# Patient Record
Sex: FEMALE | Race: OTHER | HISPANIC OR LATINO
[De-identification: names, ages, dates, MRNs, and addresses within clinical notes are randomized per-mention and may not be internally consistent; named-entity substitution may affect disease eponyms.]

---

## 2018-07-11 ENCOUNTER — RESULT REVIEW (OUTPATIENT)
Age: 73
End: 2018-07-11

## 2019-02-15 PROBLEM — Z00.00 ENCOUNTER FOR PREVENTIVE HEALTH EXAMINATION: Status: ACTIVE | Noted: 2019-02-15

## 2019-02-20 ENCOUNTER — APPOINTMENT (OUTPATIENT)
Dept: SPINE | Facility: CLINIC | Age: 74
End: 2019-02-20
Payer: MEDICARE

## 2019-02-20 VITALS
DIASTOLIC BLOOD PRESSURE: 81 MMHG | TEMPERATURE: 98.3 F | HEIGHT: 60 IN | OXYGEN SATURATION: 96 % | RESPIRATION RATE: 16 BRPM | WEIGHT: 114 LBS | BODY MASS INDEX: 22.38 KG/M2 | SYSTOLIC BLOOD PRESSURE: 144 MMHG | HEART RATE: 91 BPM

## 2019-02-20 DIAGNOSIS — Z80.8 FAMILY HISTORY OF MALIGNANT NEOPLASM OF OTHER ORGANS OR SYSTEMS: ICD-10-CM

## 2019-02-20 PROCEDURE — 99204 OFFICE O/P NEW MOD 45 MIN: CPT

## 2019-02-27 ENCOUNTER — FORM ENCOUNTER (OUTPATIENT)
Age: 74
End: 2019-02-27

## 2019-02-28 ENCOUNTER — OUTPATIENT (OUTPATIENT)
Dept: OUTPATIENT SERVICES | Facility: HOSPITAL | Age: 74
LOS: 1 days | End: 2019-02-28
Payer: MEDICARE

## 2019-02-28 ENCOUNTER — APPOINTMENT (OUTPATIENT)
Dept: MRI IMAGING | Facility: HOSPITAL | Age: 74
End: 2019-02-28
Payer: MEDICARE

## 2019-02-28 VITALS
WEIGHT: 103.4 LBS | SYSTOLIC BLOOD PRESSURE: 132 MMHG | HEIGHT: 60 IN | RESPIRATION RATE: 16 BRPM | OXYGEN SATURATION: 98 % | HEART RATE: 74 BPM | TEMPERATURE: 97 F | DIASTOLIC BLOOD PRESSURE: 61 MMHG

## 2019-02-28 PROCEDURE — 70552 MRI BRAIN STEM W/DYE: CPT | Mod: 26

## 2019-02-28 NOTE — ASU PATIENT PROFILE, ADULT - PMH
GERD (gastroesophageal reflux disease)    HLD (hyperlipidemia)    Hypothyroid    Osteoporosis    Trigeminal neuralgia

## 2019-03-01 ENCOUNTER — OUTPATIENT (OUTPATIENT)
Dept: OUTPATIENT SERVICES | Facility: HOSPITAL | Age: 74
LOS: 1 days | Discharge: HOME | End: 2019-03-01
Payer: MEDICARE

## 2019-03-01 VITALS
OXYGEN SATURATION: 97 % | SYSTOLIC BLOOD PRESSURE: 112 MMHG | HEART RATE: 86 BPM | DIASTOLIC BLOOD PRESSURE: 58 MMHG | RESPIRATION RATE: 19 BRPM

## 2019-03-01 DIAGNOSIS — Z90.49 ACQUIRED ABSENCE OF OTHER SPECIFIED PARTS OF DIGESTIVE TRACT: Chronic | ICD-10-CM

## 2019-03-01 DIAGNOSIS — Z98.890 OTHER SPECIFIED POSTPROCEDURAL STATES: Chronic | ICD-10-CM

## 2019-03-01 LAB — GLUCOSE BLDC GLUCOMTR-MCNC: 115 MG/DL — HIGH (ref 70–99)

## 2019-03-01 PROCEDURE — 76000 FLUOROSCOPY <1 HR PHYS/QHP: CPT

## 2019-03-01 PROCEDURE — 70552 MRI BRAIN STEM W/DYE: CPT

## 2019-03-01 PROCEDURE — A9585: CPT

## 2019-03-01 PROCEDURE — 82962 GLUCOSE BLOOD TEST: CPT

## 2019-03-01 PROCEDURE — 64605 INJECTION TREATMENT OF NERVE: CPT | Mod: LT

## 2019-03-01 PROCEDURE — 64610 INJECTION TREATMENT OF NERVE: CPT | Mod: LT

## 2019-03-01 RX ORDER — CEPHALEXIN 500 MG
1 CAPSULE ORAL
Qty: 10 | Refills: 0
Start: 2019-03-01 | End: 2019-03-05

## 2019-03-01 RX ORDER — CARBAMAZEPINE 200 MG
200 TABLET ORAL ONCE
Qty: 0 | Refills: 0 | Status: COMPLETED | OUTPATIENT
Start: 2019-03-01 | End: 2019-03-01

## 2019-03-01 RX ORDER — SODIUM CHLORIDE 9 MG/ML
500 INJECTION, SOLUTION INTRAVENOUS
Qty: 0 | Refills: 0 | Status: DISCONTINUED | OUTPATIENT
Start: 2019-03-01 | End: 2019-03-01

## 2019-03-01 RX ADMIN — Medication 200 MILLIGRAM(S): at 19:19

## 2019-03-01 NOTE — PACU DISCHARGE NOTE - COMMENTS
discharge instruction reviewed in-full with pt using language line good understanding verbalized discharge instruction reviewed in-full with pt using language line good understanding verbalized,   pt continues to denies any distress pt d/c home.

## 2019-03-01 NOTE — PROGRESS NOTE ADULT - SUBJECTIVE AND OBJECTIVE BOX
called to evaluate this 72 yo female s/p Rhizotomy secondary trigeminal neuralgia.  pt c/o dizziness near syncope.  bp 83/53 hr 66 sao2 99%  piv started ephedrine 10 mg IVP  BP-128/63 Hr 66  bolus 500ml RL  VS now normalized  neurosurg to evaluate.

## 2019-03-13 ENCOUNTER — APPOINTMENT (OUTPATIENT)
Dept: SPINE | Facility: CLINIC | Age: 74
End: 2019-03-13
Payer: MEDICARE

## 2019-03-13 VITALS
HEART RATE: 77 BPM | BODY MASS INDEX: 22.38 KG/M2 | OXYGEN SATURATION: 95 % | HEIGHT: 60 IN | WEIGHT: 114 LBS | SYSTOLIC BLOOD PRESSURE: 117 MMHG | RESPIRATION RATE: 16 BRPM | DIASTOLIC BLOOD PRESSURE: 77 MMHG | TEMPERATURE: 98.8 F

## 2019-03-13 PROBLEM — G50.0 TRIGEMINAL NEURALGIA: Chronic | Status: ACTIVE | Noted: 2019-03-01

## 2019-03-13 PROBLEM — E78.5 HYPERLIPIDEMIA, UNSPECIFIED: Chronic | Status: ACTIVE | Noted: 2019-03-01

## 2019-03-13 PROBLEM — E03.9 HYPOTHYROIDISM, UNSPECIFIED: Chronic | Status: ACTIVE | Noted: 2019-03-01

## 2019-03-13 PROBLEM — M81.0 AGE-RELATED OSTEOPOROSIS WITHOUT CURRENT PATHOLOGICAL FRACTURE: Chronic | Status: ACTIVE | Noted: 2019-03-01

## 2019-03-13 PROBLEM — K21.9 GASTRO-ESOPHAGEAL REFLUX DISEASE WITHOUT ESOPHAGITIS: Chronic | Status: ACTIVE | Noted: 2019-03-01

## 2019-03-13 PROCEDURE — 99024 POSTOP FOLLOW-UP VISIT: CPT

## 2019-04-24 ENCOUNTER — APPOINTMENT (OUTPATIENT)
Dept: SPINE | Facility: CLINIC | Age: 74
End: 2019-04-24
Payer: MEDICARE

## 2019-04-24 VITALS
DIASTOLIC BLOOD PRESSURE: 77 MMHG | TEMPERATURE: 97.9 F | HEIGHT: 60 IN | BODY MASS INDEX: 22.38 KG/M2 | WEIGHT: 114 LBS | HEART RATE: 83 BPM | OXYGEN SATURATION: 95 % | RESPIRATION RATE: 16 BRPM | SYSTOLIC BLOOD PRESSURE: 139 MMHG

## 2019-04-24 PROCEDURE — 99214 OFFICE O/P EST MOD 30 MIN: CPT

## 2020-12-02 ENCOUNTER — APPOINTMENT (OUTPATIENT)
Dept: SPINE | Facility: CLINIC | Age: 75
End: 2020-12-02
Payer: MEDICARE

## 2020-12-02 VITALS
BODY MASS INDEX: 21.79 KG/M2 | RESPIRATION RATE: 16 BRPM | DIASTOLIC BLOOD PRESSURE: 74 MMHG | HEIGHT: 60 IN | WEIGHT: 111 LBS | SYSTOLIC BLOOD PRESSURE: 117 MMHG | OXYGEN SATURATION: 97 % | TEMPERATURE: 97.9 F | HEART RATE: 94 BPM

## 2020-12-02 PROCEDURE — 99214 OFFICE O/P EST MOD 30 MIN: CPT

## 2020-12-02 PROCEDURE — 99072 ADDL SUPL MATRL&STAF TM PHE: CPT

## 2020-12-04 ENCOUNTER — NON-APPOINTMENT (OUTPATIENT)
Age: 75
End: 2020-12-04

## 2020-12-08 LAB — SARS-COV-2 N GENE NPH QL NAA+PROBE: NOT DETECTED

## 2020-12-09 ENCOUNTER — TRANSCRIPTION ENCOUNTER (OUTPATIENT)
Age: 75
End: 2020-12-09

## 2020-12-09 VITALS
WEIGHT: 109.57 LBS | RESPIRATION RATE: 16 BRPM | OXYGEN SATURATION: 93 % | TEMPERATURE: 98 F | HEIGHT: 60 IN | HEART RATE: 98 BPM | SYSTOLIC BLOOD PRESSURE: 148 MMHG | DIASTOLIC BLOOD PRESSURE: 73 MMHG

## 2020-12-09 NOTE — ASU PATIENT PROFILE, ADULT - PMH
GERD (gastroesophageal reflux disease)    HLD (hyperlipidemia)    Hypothyroid    Osteoporosis    Trigeminal neuralgia     GERD (gastroesophageal reflux disease)    HLD (hyperlipidemia)    Hypothyroid    Osteoporosis    Sensitive skin    Trigeminal neuralgia

## 2020-12-09 NOTE — ASU PATIENT PROFILE, ADULT - NS PRO AD PATIENT TYPE
Health Care Proxy (HCP)/Myranda (aunt) [phone] Myranda Clemens (aunt) [phone]/Health Care Proxy (HCP)

## 2020-12-09 NOTE — ASU PREOP CHECKLIST - IV STARTED
I discussed importance of advanced care plans with Laura Donahue. he does not have an updated advanced care plan documented in this chart (and if not in chart, he will provide updated advanced medical directive or develop and provide AMD to be scanned). AMD literature provided to patient if needed.
no

## 2020-12-10 ENCOUNTER — OUTPATIENT (OUTPATIENT)
Dept: OUTPATIENT SERVICES | Facility: HOSPITAL | Age: 75
LOS: 1 days | Discharge: HOME | End: 2020-12-10
Payer: MEDICARE

## 2020-12-10 ENCOUNTER — APPOINTMENT (OUTPATIENT)
Dept: SPINE | Facility: HOSPITAL | Age: 75
End: 2020-12-10

## 2020-12-10 VITALS
HEART RATE: 78 BPM | RESPIRATION RATE: 16 BRPM | TEMPERATURE: 97 F | OXYGEN SATURATION: 98 % | SYSTOLIC BLOOD PRESSURE: 122 MMHG | DIASTOLIC BLOOD PRESSURE: 78 MMHG

## 2020-12-10 DIAGNOSIS — Z98.890 OTHER SPECIFIED POSTPROCEDURAL STATES: Chronic | ICD-10-CM

## 2020-12-10 DIAGNOSIS — Z90.49 ACQUIRED ABSENCE OF OTHER SPECIFIED PARTS OF DIGESTIVE TRACT: Chronic | ICD-10-CM

## 2020-12-10 PROCEDURE — 61790 TREAT TRIGEMINAL NERVE: CPT

## 2020-12-10 PROCEDURE — 76000 FLUOROSCOPY <1 HR PHYS/QHP: CPT

## 2020-12-10 PROCEDURE — 64610 INJECTION TREATMENT OF NERVE: CPT | Mod: LT

## 2020-12-10 RX ORDER — CEPHALEXIN 500 MG
1 CAPSULE ORAL
Qty: 21 | Refills: 0
Start: 2020-12-10 | End: 2020-12-16

## 2020-12-10 RX ORDER — ACETAMINOPHEN 500 MG
750 TABLET ORAL ONCE
Refills: 0 | Status: COMPLETED | OUTPATIENT
Start: 2020-12-10 | End: 2020-12-10

## 2020-12-10 RX ORDER — SODIUM CHLORIDE 9 MG/ML
1000 INJECTION INTRAMUSCULAR; INTRAVENOUS; SUBCUTANEOUS
Refills: 0 | Status: ACTIVE | OUTPATIENT
Start: 2020-12-10 | End: 2021-11-08

## 2020-12-10 RX ORDER — POVIDONE-IODINE 5 %
1 AEROSOL (ML) TOPICAL ONCE
Refills: 0 | Status: COMPLETED | OUTPATIENT
Start: 2020-12-10 | End: 2020-12-10

## 2020-12-10 RX ADMIN — Medication 1 APPLICATION(S): at 07:34

## 2020-12-10 RX ADMIN — Medication 300 MILLIGRAM(S): at 09:45

## 2020-12-10 NOTE — H&P ADULT - ASSESSMENT
72F with TN here for repeat ablative procedure on the left side  -RF ablation  -dc home after the procedure.

## 2020-12-10 NOTE — H&P ADULT - NSHPPHYSICALEXAM_GEN_ALL_CORE
AAOx3  EOMI, PERRL  Face =  DUFFY 5/5    Cardio: RRR  Pulm: normal breath sounds  Abdomen: soft non tender

## 2020-12-10 NOTE — H&P ADULT - NSICDXPASTMEDICALHX_GEN_ALL_CORE_FT
PAST MEDICAL HISTORY:  GERD (gastroesophageal reflux disease)     HLD (hyperlipidemia)     Hypothyroid     Osteoporosis     Sensitive skin     Trigeminal neuralgia

## 2020-12-11 ENCOUNTER — NON-APPOINTMENT (OUTPATIENT)
Age: 75
End: 2020-12-11

## 2020-12-14 PROBLEM — R20.3 HYPERESTHESIA: Chronic | Status: ACTIVE | Noted: 2020-12-09

## 2020-12-23 ENCOUNTER — APPOINTMENT (OUTPATIENT)
Dept: SPINE | Facility: CLINIC | Age: 75
End: 2020-12-23
Payer: MEDICARE

## 2020-12-23 VITALS
BODY MASS INDEX: 21.87 KG/M2 | TEMPERATURE: 97.7 F | HEART RATE: 77 BPM | WEIGHT: 112 LBS | DIASTOLIC BLOOD PRESSURE: 63 MMHG | SYSTOLIC BLOOD PRESSURE: 115 MMHG

## 2020-12-23 PROCEDURE — 99024 POSTOP FOLLOW-UP VISIT: CPT

## 2021-08-18 NOTE — BRIEF OPERATIVE NOTE - IV INFUSIONS - CRYSTALLOIDS
From: Sea [place]  To: Leslie Santillan  Sent: 8/17/2021 7:25 PM CDT  Subject: Non-Urgent Medical Question    Vadim Nelson I am having a colonoscopy done on August 23rd and I would like to know how to take my insulin on Monday morning and should I take my metformin. Thanks Denton Gillette   1L

## 2022-05-03 ENCOUNTER — APPOINTMENT (OUTPATIENT)
Dept: SPINE | Facility: CLINIC | Age: 77
End: 2022-05-03
Payer: MEDICARE

## 2022-05-03 VITALS
TEMPERATURE: 98.7 F | OXYGEN SATURATION: 97 % | SYSTOLIC BLOOD PRESSURE: 125 MMHG | HEART RATE: 85 BPM | BODY MASS INDEX: 21.99 KG/M2 | DIASTOLIC BLOOD PRESSURE: 76 MMHG | HEIGHT: 60 IN | RESPIRATION RATE: 18 BRPM | WEIGHT: 112 LBS

## 2022-05-03 PROCEDURE — 99214 OFFICE O/P EST MOD 30 MIN: CPT

## 2023-06-21 NOTE — ASU PREOP CHECKLIST - COMMENTS
RN spoke with Luana, spouse - no permission on chart. Pt was present. RN spoke with him, and he gave RN permission to speak with Luana regarding these concerns.    Onset: today  Location / description: hypertension - 164/102 at PT today  Precipitating Factors: Had a tooth pulled yesterday. Residual R-sided weakness from stroke that occurred 7 weeks ago. Not worsening.  Pain Scale (1-10), 10 highest: 0/10  What  improves / worsens symptoms: not addressed.   Symptom specific medications: lisinopril 40 MG, amlodipine, carvedilol   Recent visits (last 3-4 weeks) for same reason or recent surgery:  None.      PLAN:  See Provider within next few days    Patient/Caller agrees to follow recommendations. Pt has seen Hellen Rogers CNP, at [facility] on 8/31/2022, and wanted to see her again. No appointments available within appropriate timeframe, so we scheduled him with alternate provider MARQUES Tang, for 6/23/2023 at 0920. Verbalized understanding, in agreement with the plan, and had no further questions at this time. Aware to call back with new/worsening symptoms, or if at all needed.    Reason for Disposition  • Systolic BP  >= 160 OR Diastolic >= 100    Protocols used: BLOOD PRESSURE - HIGH-A-      
Regarding: M 58 Elevated Bp 164/102  ----- Message from Rosmery Stanford sent at 6/21/2023 10:45 AM CDT -----  Patient Name: Reji Medina  Caller: Luana Lane  Call Back # : [phone]    Is this for an Active episode for Home Health, Hospice or Palliative Care? No   Specialist or PCP Name: Hellen Rogers  Symptoms: Elevated /102  Pregnant (females aged 13-60. If Yes, how long?) : NA  Name of Clinic Site / Acct# : [facility]      Are you currently at (or near) your place of residence? Yes [place] [address]     Use following scripting for patients waiting for a callback:   \"Nurse callback times vary based on call volumes; please be aware the return phone call may come from an unidentified or out of state phone number. If your symptoms worsen or become life threatening while waiting, you should seek immediate assistance by calling 911 or going to the ER for evaluation.\"    
am med with a sip of water at 0435

## 2024-05-22 ENCOUNTER — APPOINTMENT (OUTPATIENT)
Dept: NEUROSURGERY | Facility: CLINIC | Age: 79
End: 2024-05-22
Payer: MEDICARE

## 2024-05-22 VITALS
BODY MASS INDEX: 20.81 KG/M2 | HEIGHT: 60 IN | DIASTOLIC BLOOD PRESSURE: 72 MMHG | HEART RATE: 86 BPM | SYSTOLIC BLOOD PRESSURE: 121 MMHG | TEMPERATURE: 97.8 F | RESPIRATION RATE: 18 BRPM | OXYGEN SATURATION: 98 % | WEIGHT: 106 LBS

## 2024-05-22 DIAGNOSIS — R26.81 UNSTEADINESS ON FEET: ICD-10-CM

## 2024-05-22 DIAGNOSIS — R51.9 HEADACHE, UNSPECIFIED: ICD-10-CM

## 2024-05-22 DIAGNOSIS — I10 ESSENTIAL (PRIMARY) HYPERTENSION: ICD-10-CM

## 2024-05-22 DIAGNOSIS — E78.00 PURE HYPERCHOLESTEROLEMIA, UNSPECIFIED: ICD-10-CM

## 2024-05-22 PROCEDURE — 99205 OFFICE O/P NEW HI 60 MIN: CPT

## 2024-05-22 RX ORDER — LEVOTHYROXINE SODIUM 0.03 MG/1
25 TABLET ORAL
Refills: 0 | Status: ACTIVE | COMMUNITY

## 2024-05-22 RX ORDER — LEVOTHYROXINE SODIUM 25 UG/1
25 TABLET ORAL DAILY
Refills: 0 | Status: DISCONTINUED | COMMUNITY
End: 2024-05-22

## 2024-05-22 RX ORDER — ALENDRONATE SODIUM 70 MG/1
70 TABLET ORAL
Refills: 0 | Status: DISCONTINUED | COMMUNITY
End: 2024-05-22

## 2024-05-22 RX ORDER — ATORVASTATIN CALCIUM 20 MG/1
20 TABLET, FILM COATED ORAL
Refills: 0 | Status: ACTIVE | COMMUNITY

## 2024-05-22 RX ORDER — CARBAMAZEPINE 300 MG/1
300 CAPSULE, EXTENDED RELEASE ORAL
Refills: 0 | Status: ACTIVE | COMMUNITY

## 2024-05-22 RX ORDER — AMLODIPINE BESYLATE 2.5 MG/1
2.5 TABLET ORAL
Refills: 0 | Status: ACTIVE | COMMUNITY

## 2024-05-22 RX ORDER — IBANDRONATE SODIUM 150 MG/1
150 TABLET ORAL
Refills: 0 | Status: ACTIVE | COMMUNITY

## 2024-05-22 NOTE — REVIEW OF SYSTEMS
[As Noted in HPI] : as noted in HPI [Depression] : depression [Hypersensitivity] : hypersensitivity [Dizziness] : dizziness [Difficulty Walking] : difficulty walking [Negative] : Respiratory [de-identified] : Fell on Good Friday 2024 and since has required cane to walk.  Sometimes wake up feeling like the room is spinning for up to 1 hr.  Left face feels like it is burning and "electric shock"-like any time she talks, eats, brushes her teeth, or touches her face. Pain then is 10/10

## 2024-05-22 NOTE — ASSESSMENT
[FreeTextEntry1] : TESSY ANDRE is a 78 year-old Chinese-speaking female with a PMHx significant for Trigeminal s/p Rhizotomy (2019, 2020), Osteoarthritis, HTN, Hypercholesterolemia, who presents to my office today for worsening Left trigeminal neuralgia.  PLAN: - obtain MRI Brain  - referred to Dr. Gonzales clinic to review and discuss further TN treatment   The patient was instructed that if they should immediately call 911 or go to the Emergency Department if they experience symptoms of severe thunderclap headache, syncope, unexplained nausea/vomiting, visual changes, seizure-like activity, new weakness or numbness of extremities.   Patient verbalizes agreement and understanding with plan of care.

## 2024-05-22 NOTE — REASON FOR VISIT
[New Patient Visit] : a new patient visit [Referred By: _________] : Patient was referred by ALICE [Family Member] : family member [FreeTextEntry1] : Left Trigeminal Neuralgia exacerbation

## 2024-05-22 NOTE — PHYSICAL EXAM
[General Appearance - Alert] : alert [Oriented To Time, Place, And Person] : oriented to person, place, and time [Impaired Insight] : insight and judgment were intact [Affect] : the affect was normal [Memory Recent] : recent memory was not impaired [Motor Tone] : muscle tone was normal in all four extremities [Motor Strength] : muscle strength was normal in all four extremities [Sclera] : the sclera and conjunctiva were normal [Full Visual Field] : full visual field [Outer Ear] : the ears and nose were normal in appearance [Neck Appearance] : the appearance of the neck was normal [] : no respiratory distress [Heart Rate And Rhythm] : heart rate was normal and rhythm regular [Skin Color & Pigmentation] : normal skin color and pigmentation [FreeTextEntry8] : uses cane in R hand for balance and extended distances [FreeTextEntry1] : 10/10 pain when touching Left face

## 2024-05-22 NOTE — HISTORY OF PRESENT ILLNESS
[de-identified] : TESSY ANDRE is a 78 year-old Ukrainian-speaking female with a PMHx significant for Trigeminal s/p Rhizotomy (2019, 2020), Osteoarthritis, HTN, Hypercholesterolemia, who presents to my office today for worsening Left trigeminal neuralgia.    Aug 2018 patient started experiencing significant shock-like Left facial pain after Lower molar tooth extraction. The pain persisted and multiple dental follow-ups proved negative as the pain etiology. She then sought treatment from her PCP who referred her to a Neurologist. MRI Brain (2/4/2019) with linear hyperintensity in the L frontal temporal lobe.  Patient saw NP Lorraine 2/20/2019 at which point plan made to treat Trigeminal Neuralgia with Rhizotomy.    3/1/19 - Left trigeminal Percutaneous Radiofrequency Rhizotomy with Dr. Mckay   3/13/19 - f/u appt with Dr. Mckay. Patient reports doing well the "electricity" of the face which prohibited talking and eating has subsided but she is concerned about numbness of the face. Reports discomfort of the Left face/site of intervention.  Immediate postop she was with periods of drooling but this has also improved. Patient to return to clinic in 4 weeks for symptom check   4/24/19 - f/u appt with Dr. Mckay. Reports she continue to do well "the electricity" is gone.  She has a little numbness but is concerned about episodes of drooling (will meet with ENT).   12/2/20 - f/u appt with Dr. Mckay. Reports she has been doing well until about 1 month ago she began having "electrical" sensation on the LEFT cheek. the pain has been "growing" and she is now unable to eat/ drink/ brush her teeth. Symptoms persist despite continuation of Carbamazepine at a decreased dose than preop (current dose 300mg QHS). She was recently advised to increase the dose but reports she becomes too sleepy so she only uses the medication at nights. Denies facial weakness/numbness but has intermittent numbness of her tongue. Dr Mckay discussed a 2nd percutaneous Rhizotomy  12/10/2020 - 2nd Left Percutaneous Rhizotomy with Dr. Mckay  12/23/2020 - f/u appt with Dr. Mckay. Reports doing well preop LEFT facial pain has resolved but she notices "numbness/feeling of anesthesia of the lip". Continue to increase activity as tolerated.  Educated patient that medications can be weaned over time with the assistance of ordering MD. Also that a small maintenance dose may be needed  5/3/22 - Reports she had been doing well until January of this year inability she noticed breakthrough facial pain resulting in an inability to eat (V3). This Similar to her pre- procedure symptoms. Denies facial weakness/numbness. Reports a recent visit saw her neurologist as well as her dentist and was told she need a molar tooth extracted but was cautioned against this by her neurologist. Symptoms persist despite continued Carbamezapine 300mg.   Neuro: Dr. Nitin Mccray

## 2024-06-04 ENCOUNTER — APPOINTMENT (OUTPATIENT)
Dept: MRI IMAGING | Facility: CLINIC | Age: 79
End: 2024-06-04
Payer: MEDICARE

## 2024-06-04 ENCOUNTER — OUTPATIENT (OUTPATIENT)
Dept: OUTPATIENT SERVICES | Facility: HOSPITAL | Age: 79
LOS: 1 days | End: 2024-06-04

## 2024-06-04 DIAGNOSIS — Z90.49 ACQUIRED ABSENCE OF OTHER SPECIFIED PARTS OF DIGESTIVE TRACT: Chronic | ICD-10-CM

## 2024-06-04 DIAGNOSIS — Z98.890 OTHER SPECIFIED POSTPROCEDURAL STATES: Chronic | ICD-10-CM

## 2024-06-04 PROCEDURE — 70553 MRI BRAIN STEM W/O & W/DYE: CPT | Mod: 26

## 2024-06-18 ENCOUNTER — APPOINTMENT (OUTPATIENT)
Dept: NEUROSURGERY | Facility: CLINIC | Age: 79
End: 2024-06-18
Payer: MEDICARE

## 2024-06-18 DIAGNOSIS — G50.0 TRIGEMINAL NEURALGIA: ICD-10-CM

## 2024-06-18 DIAGNOSIS — Z86.39 PERSONAL HISTORY OF OTHER ENDOCRINE, NUTRITIONAL AND METABOLIC DISEASE: ICD-10-CM

## 2024-06-18 DIAGNOSIS — Z09 ENCOUNTER FOR FOLLOW-UP EXAMINATION AFTER COMPLETED TREATMENT FOR CONDITIONS OTHER THAN MALIGNANT NEOPLASM: ICD-10-CM

## 2024-06-18 DIAGNOSIS — Z01.818 ENCOUNTER FOR OTHER PREPROCEDURAL EXAMINATION: ICD-10-CM

## 2024-06-18 PROCEDURE — 99214 OFFICE O/P EST MOD 30 MIN: CPT

## 2024-06-18 NOTE — REASON FOR VISIT
[New Patient Visit] : a new patient visit [[employer] ] : provided by [employer]   [TWNoteComboBox1] : Faroese

## 2024-06-18 NOTE — DATA REVIEWED
[de-identified] : brain w/wo on 6/4/2024 in PACS EXAM: [id number] - MR BRAIN WAW IC  - ORDERED BY: ROSEANNA SPANN   PROCEDURE DATE:  06/04/2024    INTERPRETATION:  PROCEDURE: MRI Brain with and without contrast  INDICATION: Trigeminal neuralgia and gait instability  TECHNIQUE: Multiplanar multi sequential MR images the brain were obtained before and after the administration of 5 mL IV Gadavist using IAC protocol, 5 mL were discarded.  COMPARISON: MRI brain 2/28/2019  FINDINGS:  There is no pathologic enhancement or mass in the lulu or within the prepontine cistern along the course of the trigeminal nerves. Again demonstrated is asymmetric and enhancement involving the lateral inferior margin the left cavernous sinus (for example series 10 image 34) and enhancement in the left foramen rotundum (for example series 10 image 31) which is not significantly changed from prior exam.  On heavily T2-weighted images there is no evidence of vascular contact with the left trigeminal nerve. Again demonstrated is vascular contact with the root entry zone of the right trigeminal nerve which is stable from prior exam. There is normal CSF intensity signal within the bilateral inner ear structures. There is no pathologic enhancement involving the bilateral cranial nerves VII and VIII complex.  The ventricles and sulci are normal in size and configuration.  There is no mass, mass effect, midline shift or extra-axial collection. There is no acute hemorrhage.  There are scattered foci of T2/FLAIR hyperintense signal in the periventricular subcortical white matter likely on the basis of mild chronic microvascular ischemic changes. The diffusion-weighted images demonstrate no recent infarction.  There is no susceptibility related signal loss to suggest hemosiderin deposition or calcification.  The vascular flow voids are present.  The sella and suprasellar regions are unremarkable.  The postcontrast images demonstrate no abnormal intracranial enhancement.  The visualized paranasal sinuses are free of mucosal disease. The mastoid air cells are well-aerated.  There is enhancement in the right parietal scalp (series 11 image 123 which is new from prior exam.  IMPRESSION:  Since prior MRI brain 2/28/2019, again demonstrated is asymmetric thickening enhancement along the lateral inferior aspect of the left cavernous sinus and foramen rotundum which is nonspecific and perhaps related to asymmetric venous plexus. Clinical correlation is recommended.  Nonspecific enhancement in the right parietal scalp, correlate for history of recent falls/scalp hematoma.  No neurovascular conflict in the left trigeminal nerve.  No acute infarction.  --- End of Report ---       SIMONE BRADY MD; Attending Radiologist This document has been electronically signed. Jun 7 2024  9:08AM

## 2024-06-18 NOTE — PHYSICAL EXAM
[General Appearance - Alert] : alert [General Appearance - In No Acute Distress] : in no acute distress [General Appearance - Well Nourished] : well nourished [General Appearance - Well-Appearing] : healthy appearing [Oriented To Time, Place, And Person] : oriented to person, place, and time [Impaired Insight] : insight and judgment were intact [Memory Recent] : recent memory was not impaired [Affect] : the affect was normal

## 2024-06-18 NOTE — ASSESSMENT
[FreeTextEntry1] : L trigeminal neuralgia. Discussed with the patient for image. Given failed conservative management and previous surgery history, surgergical intervention (rhizotomy for percutaneous nerve ablation) can be beneficial.  Advised to come to the office for physical exam and discuss surgery in detail in July 8, 2024.

## 2024-06-18 NOTE — HISTORY OF PRESENT ILLNESS
[Home] : at home, [unfilled] , at the time of the visit. [Medical Office: ([facility])___] : at the medical office located in  [Verbal consent obtained from patient] : the patient, [unfilled] [FreeTextEntry1] : L trigeminal neuralgia (h/o percutaneous Rhizotomy with Dr. Mckay) [de-identified] : TESSY ANDRE is a 78 year-old Chinese-speaking female with a PMHx significant for Trigeminal s/p Rhizotomy (2019, 2020), Osteoarthritis, HTN, Hypercholesterolemia, who presents to my office today for worsening Left trigeminal neuralgia.    Aug 2018 patient started experiencing significant shock-like Left facial pain after Lower molar tooth extraction. The pain persisted and multiple dental follow-ups proved negative as the pain etiology. She then sought treatment from her PCP who referred her to a Neurologist. MRI Brain (2/4/2019) with linear hyperintensity in the L frontal temporal lobe.  Patient saw NP Lorraine 2/20/2019 at which point plan made to treat Trigeminal Neuralgia with Rhizotomy.    3/1/19 - Left trigeminal Percutaneous Radiofrequency Rhizotomy with Dr. Mckay   3/13/19 - f/u appt with Dr. Mckay. Patient reports doing well the "electricity" of the face which prohibited talking and eating has subsided but she is concerned about numbness of the face. Reports discomfort of the Left face/site of intervention.  Immediate postop she was with periods of drooling but this has also improved. Patient to return to clinic in 4 weeks for symptom check   4/24/19 - f/u appt with Dr. Mckay. Reports she continue to do well "the electricity" is gone.  She has a little numbness but is concerned about episodes of drooling (will meet with ENT).   12/2/20 - f/u appt with Dr. Mckay. Reports she has been doing well until about 1 month ago she began having "electrical" sensation on the LEFT cheek. the pain has been "growing" and she is now unable to eat/ drink/ brush her teeth. Symptoms persist despite continuation of Carbamazepine at a decreased dose than preop (current dose 300mg QHS). She was recently advised to increase the dose but reports she becomes too sleepy so she only uses the medication at nights. Denies facial weakness/numbness but has intermittent numbness of her tongue. Dr Mckay discussed a 2nd percutaneous Rhizotomy  12/10/2020 - 2nd Left Percutaneous Rhizotomy with Dr. Mckay  12/23/2020 - f/u appt with Dr. Mckay. Reports doing well preop LEFT facial pain has resolved but she notices "numbness/feeling of anesthesia of the lip". Continue to increase activity as tolerated.  Educated patient that medications can be weaned over time with the assistance of ordering MD. Also that a small maintenance dose may be needed  5/3/22 - Reports she had been doing well until January of this year inability she noticed breakthrough facial pain resulting in an inability to eat (V3). This Similar to her pre- procedure symptoms. Denies facial weakness/numbness. Reports a recent visit saw her neurologist as well as her dentist and was told she need a molar tooth extracted but was cautioned against this by her neurologist. Symptoms persist despite continued Carbamezapine 300mg.   Neuro: Dr. Nitin Mccray

## 2024-07-03 PROBLEM — Z86.39 HISTORY OF THYROID NODULE: Status: RESOLVED | Noted: 2019-02-20 | Resolved: 2024-07-03

## 2024-07-08 ENCOUNTER — APPOINTMENT (OUTPATIENT)
Dept: NEUROSURGERY | Facility: CLINIC | Age: 79
End: 2024-07-08
Payer: MEDICARE

## 2024-07-08 ENCOUNTER — INPATIENT (INPATIENT)
Facility: HOSPITAL | Age: 79
LOS: 2 days | Discharge: HOME | End: 2024-07-11
Attending: NEUROLOGICAL SURGERY | Admitting: NEUROLOGICAL SURGERY
Payer: MEDICARE

## 2024-07-08 ENCOUNTER — RESULT REVIEW (OUTPATIENT)
Age: 79
End: 2024-07-08

## 2024-07-08 VITALS
HEIGHT: 59.06 IN | DIASTOLIC BLOOD PRESSURE: 67 MMHG | TEMPERATURE: 99 F | WEIGHT: 102.96 LBS | HEART RATE: 72 BPM | RESPIRATION RATE: 17 BRPM | OXYGEN SATURATION: 95 % | SYSTOLIC BLOOD PRESSURE: 149 MMHG

## 2024-07-08 VITALS
DIASTOLIC BLOOD PRESSURE: 90 MMHG | WEIGHT: 103.44 LBS | SYSTOLIC BLOOD PRESSURE: 150 MMHG | TEMPERATURE: 97.8 F | HEIGHT: 59.21 IN | HEART RATE: 77 BPM | OXYGEN SATURATION: 98 % | BODY MASS INDEX: 20.85 KG/M2 | RESPIRATION RATE: 18 BRPM

## 2024-07-08 DIAGNOSIS — Z98.890 OTHER SPECIFIED POSTPROCEDURAL STATES: Chronic | ICD-10-CM

## 2024-07-08 DIAGNOSIS — R51.9 HEADACHE, UNSPECIFIED: ICD-10-CM

## 2024-07-08 DIAGNOSIS — E03.9 HYPOTHYROIDISM, UNSPECIFIED: ICD-10-CM

## 2024-07-08 DIAGNOSIS — G50.0 TRIGEMINAL NEURALGIA: ICD-10-CM

## 2024-07-08 DIAGNOSIS — Z90.49 ACQUIRED ABSENCE OF OTHER SPECIFIED PARTS OF DIGESTIVE TRACT: Chronic | ICD-10-CM

## 2024-07-08 DIAGNOSIS — Z86.39 PERSONAL HISTORY OF OTHER ENDOCRINE, NUTRITIONAL AND METABOLIC DISEASE: ICD-10-CM

## 2024-07-08 DIAGNOSIS — M81.0 AGE-RELATED OSTEOPOROSIS WITHOUT CURRENT PATHOLOGICAL FRACTURE: ICD-10-CM

## 2024-07-08 DIAGNOSIS — E78.5 HYPERLIPIDEMIA, UNSPECIFIED: ICD-10-CM

## 2024-07-08 LAB
ANION GAP SERPL CALC-SCNC: 12 MMOL/L — SIGNIFICANT CHANGE UP (ref 5–17)
APTT BLD: 30.6 SEC — SIGNIFICANT CHANGE UP (ref 24.5–35.6)
BASOPHILS # BLD AUTO: 0.05 K/UL — SIGNIFICANT CHANGE UP (ref 0–0.2)
BASOPHILS NFR BLD AUTO: 0.9 % — SIGNIFICANT CHANGE UP (ref 0–2)
BLD GP AB SCN SERPL QL: NEGATIVE — SIGNIFICANT CHANGE UP
BUN SERPL-MCNC: 17 MG/DL — SIGNIFICANT CHANGE UP (ref 7–23)
CALCIUM SERPL-MCNC: 9.1 MG/DL — SIGNIFICANT CHANGE UP (ref 8.4–10.5)
CHLORIDE SERPL-SCNC: 100 MMOL/L — SIGNIFICANT CHANGE UP (ref 96–108)
CO2 SERPL-SCNC: 28 MMOL/L — SIGNIFICANT CHANGE UP (ref 22–31)
CREAT SERPL-MCNC: 0.55 MG/DL — SIGNIFICANT CHANGE UP (ref 0.5–1.3)
EGFR: 94 ML/MIN/1.73M2 — SIGNIFICANT CHANGE UP
EOSINOPHIL # BLD AUTO: 0.09 K/UL — SIGNIFICANT CHANGE UP (ref 0–0.5)
EOSINOPHIL NFR BLD AUTO: 1.6 % — SIGNIFICANT CHANGE UP (ref 0–6)
GLUCOSE SERPL-MCNC: 100 MG/DL — HIGH (ref 70–99)
HCT VFR BLD CALC: 44.6 % — SIGNIFICANT CHANGE UP (ref 34.5–45)
HGB BLD-MCNC: 14.4 G/DL — SIGNIFICANT CHANGE UP (ref 11.5–15.5)
IMM GRANULOCYTES NFR BLD AUTO: 0.2 % — SIGNIFICANT CHANGE UP (ref 0–0.9)
INR BLD: 0.89 — SIGNIFICANT CHANGE UP (ref 0.85–1.18)
LYMPHOCYTES # BLD AUTO: 1.51 K/UL — SIGNIFICANT CHANGE UP (ref 1–3.3)
LYMPHOCYTES # BLD AUTO: 26.6 % — SIGNIFICANT CHANGE UP (ref 13–44)
MCHC RBC-ENTMCNC: 30.5 PG — SIGNIFICANT CHANGE UP (ref 27–34)
MCHC RBC-ENTMCNC: 32.3 GM/DL — SIGNIFICANT CHANGE UP (ref 32–36)
MCV RBC AUTO: 94.5 FL — SIGNIFICANT CHANGE UP (ref 80–100)
MONOCYTES # BLD AUTO: 0.54 K/UL — SIGNIFICANT CHANGE UP (ref 0–0.9)
MONOCYTES NFR BLD AUTO: 9.5 % — SIGNIFICANT CHANGE UP (ref 2–14)
NEUTROPHILS # BLD AUTO: 3.47 K/UL — SIGNIFICANT CHANGE UP (ref 1.8–7.4)
NEUTROPHILS NFR BLD AUTO: 61.2 % — SIGNIFICANT CHANGE UP (ref 43–77)
NRBC # BLD: 0 /100 WBCS — SIGNIFICANT CHANGE UP (ref 0–0)
PLATELET # BLD AUTO: 275 K/UL — SIGNIFICANT CHANGE UP (ref 150–400)
POTASSIUM SERPL-MCNC: 4.2 MMOL/L — SIGNIFICANT CHANGE UP (ref 3.5–5.3)
POTASSIUM SERPL-SCNC: 4.2 MMOL/L — SIGNIFICANT CHANGE UP (ref 3.5–5.3)
PROTHROM AB SERPL-ACNC: 10.2 SEC — SIGNIFICANT CHANGE UP (ref 9.5–13)
RBC # BLD: 4.72 M/UL — SIGNIFICANT CHANGE UP (ref 3.8–5.2)
RBC # FLD: 13.2 % — SIGNIFICANT CHANGE UP (ref 10.3–14.5)
RH IG SCN BLD-IMP: POSITIVE — SIGNIFICANT CHANGE UP
SODIUM SERPL-SCNC: 140 MMOL/L — SIGNIFICANT CHANGE UP (ref 135–145)
WBC # BLD: 5.67 K/UL — SIGNIFICANT CHANGE UP (ref 3.8–10.5)
WBC # FLD AUTO: 5.67 K/UL — SIGNIFICANT CHANGE UP (ref 3.8–10.5)

## 2024-07-08 PROCEDURE — 93010 ELECTROCARDIOGRAM REPORT: CPT

## 2024-07-08 PROCEDURE — 71045 X-RAY EXAM CHEST 1 VIEW: CPT | Mod: 26

## 2024-07-08 PROCEDURE — 99215 OFFICE O/P EST HI 40 MIN: CPT

## 2024-07-08 PROCEDURE — 70450 CT HEAD/BRAIN W/O DYE: CPT | Mod: 26,MC

## 2024-07-08 PROCEDURE — 99285 EMERGENCY DEPT VISIT HI MDM: CPT

## 2024-07-08 PROCEDURE — 93306 TTE W/DOPPLER COMPLETE: CPT | Mod: 26

## 2024-07-08 PROCEDURE — 99222 1ST HOSP IP/OBS MODERATE 55: CPT

## 2024-07-08 PROCEDURE — 99221 1ST HOSP IP/OBS SF/LOW 40: CPT

## 2024-07-08 RX ORDER — IBANDRONATE SODIUM 150 MG/1
1 TABLET, FILM COATED ORAL
Refills: 0 | DISCHARGE

## 2024-07-08 RX ORDER — ACETAMINOPHEN 325 MG
650 TABLET ORAL EVERY 6 HOURS
Refills: 0 | Status: DISCONTINUED | OUTPATIENT
Start: 2024-07-08 | End: 2024-07-10

## 2024-07-08 RX ORDER — CARBAMAZEPINE 200 MG/1
300 TABLET ORAL DAILY
Refills: 0 | Status: DISCONTINUED | OUTPATIENT
Start: 2024-07-08 | End: 2024-07-10

## 2024-07-08 RX ORDER — ATORVASTATIN CALCIUM 20 MG/1
1 TABLET, FILM COATED ORAL
Refills: 0 | DISCHARGE

## 2024-07-08 RX ORDER — AMLODIPINE BESYLATE 2.5 MG/1
1 TABLET ORAL
Refills: 0 | DISCHARGE

## 2024-07-08 RX ORDER — CARBAMAZEPINE 200 MG/1
1 TABLET ORAL
Refills: 0 | DISCHARGE

## 2024-07-08 RX ORDER — AMLODIPINE BESYLATE 2.5 MG/1
2.5 TABLET ORAL DAILY
Refills: 0 | Status: DISCONTINUED | OUTPATIENT
Start: 2024-07-08 | End: 2024-07-10

## 2024-07-08 RX ORDER — LEVOTHYROXINE SODIUM 25 MCG
1 TABLET ORAL
Refills: 0 | DISCHARGE

## 2024-07-08 RX ORDER — ATORVASTATIN CALCIUM 20 MG/1
20 TABLET, FILM COATED ORAL AT BEDTIME
Refills: 0 | Status: DISCONTINUED | OUTPATIENT
Start: 2024-07-08 | End: 2024-07-10

## 2024-07-08 RX ORDER — ENOXAPARIN SODIUM 100 MG/ML
40 INJECTION SUBCUTANEOUS
Refills: 0 | Status: DISCONTINUED | OUTPATIENT
Start: 2024-07-08 | End: 2024-07-09

## 2024-07-08 RX ORDER — LEVOTHYROXINE SODIUM 25 MCG
25 TABLET ORAL DAILY
Refills: 0 | Status: DISCONTINUED | OUTPATIENT
Start: 2024-07-08 | End: 2024-07-10

## 2024-07-08 RX ADMIN — ATORVASTATIN CALCIUM 20 MILLIGRAM(S): 20 TABLET, FILM COATED ORAL at 22:24

## 2024-07-08 RX ADMIN — Medication 650 MILLIGRAM(S): at 22:24

## 2024-07-08 RX ADMIN — ENOXAPARIN SODIUM 40 MILLIGRAM(S): 100 INJECTION SUBCUTANEOUS at 22:23

## 2024-07-08 RX ADMIN — Medication 650 MILLIGRAM(S): at 23:24

## 2024-07-08 RX ADMIN — CARBAMAZEPINE 300 MILLIGRAM(S): 200 TABLET ORAL at 22:36

## 2024-07-08 NOTE — CONSULT NOTE ADULT - SUBJECTIVE AND OBJECTIVE BOX
Patient is a 78y old  Female who presents with a chief complaint of Trigeminal Neuralgia (08 Jul 2024 11:15)      HPI:  78 year old female, PMH trigeminal neuralgia s/p rhizotomy, ostearthritis, HTN, HLD, presented to neurosurgery outpatient office today for worsening facial pain. Pt started to experience L sided facial pain in 2018 after a tooth extraction and outpatient workup with MRI showed trigeminal neuralgia. Pt had rhizotomy in 2019 with Dr. Mckay with some improvement of symptoms which returned 8 months later. She had a repeat rhizotomy in 12/2020 with Dr. Mckay with improved symptoms again. Starting about 1 week ago, she has been experiencing worsening L sided face pain and "electrical" feelings similar to prior trigeminal neuralgia symptoms. She is unable to eat due to pain. She was sent to ED for admission for rhizotomy with Dr. Gonzales on 7/10.  (08 Jul 2024 11:15)      REVIEW OF SYSTEMS: see HPI    PAST MEDICAL & SURGICAL HISTORY:  Hypothyroid      HLD (hyperlipidemia)      GERD (gastroesophageal reflux disease)      Trigeminal neuralgia      Osteoporosis      Sensitive skin      S/P thyroidectomy  partial      S/P cholecystectomy          FAMILY HISTORY:    SOCIAL HISTORY:  Tobacco use:  EtOH use:  Recreational drug use:    MEDICATIONS:  MEDICATIONS  (STANDING):  amLODIPine   Tablet 2.5 milliGRAM(s) Oral daily  atorvastatin 20 milliGRAM(s) Oral at bedtime  carBAMazepine ER Capsule 300 milliGRAM(s) Oral daily  levothyroxine 25 MICROGram(s) Oral daily    MEDICATIONS  (PRN):  acetaminophen     Tablet .. 650 milliGRAM(s) Oral every 6 hours PRN Mild Pain (1 - 3)      ALLERGIES:  Allergies    No Known Allergies    Intolerances        VITAL SIGNS:  Vital Signs Last 24 Hrs  T(C): 36.8 (08 Jul 2024 12:31), Max: 37.1 (08 Jul 2024 09:35)  T(F): 98.2 (08 Jul 2024 12:31), Max: 98.7 (08 Jul 2024 09:35)  HR: 73 (08 Jul 2024 12:31) (72 - 73)  BP: 125/65 (08 Jul 2024 12:31) (125/65 - 149/67)  BP(mean): --  RR: 16 (08 Jul 2024 12:31) (16 - 17)  SpO2: 97% (08 Jul 2024 12:31) (95% - 97%)    Parameters below as of 08 Jul 2024 12:31  Patient On (Oxygen Delivery Method): room air          PHYSICAL EXAM:        LABS:                        14.4   5.67  )-----------( 275      ( 08 Jul 2024 10:13 )             44.6     07-08    140  |  100  |  17  ----------------------------<  100<H>  4.2   |  28  |  0.55    Ca    9.1      08 Jul 2024 10:13      PT/INR - ( 08 Jul 2024 10:03 )   PT: 10.2 sec;   INR: 0.89          PTT - ( 08 Jul 2024 10:03 )  PTT:30.6 sec  Urinalysis Basic - ( 08 Jul 2024 10:13 )    Color: x / Appearance: x / SG: x / pH: x  Gluc: 100 mg/dL / Ketone: x  / Bili: x / Urobili: x   Blood: x / Protein: x / Nitrite: x   Leuk Esterase: x / RBC: x / WBC x   Sq Epi: x / Non Sq Epi: x / Bacteria: x          CAPILLARY BLOOD GLUCOSE              RADIOLOGY & ADDITIONAL TESTS: Reviewed. Patient is a 78y old  Female who presents with a chief complaint of Trigeminal Neuralgia (08 Jul 2024 11:15)      HPI:  78 year old female, PMH trigeminal neuralgia s/p rhizotomy, ostearthritis, HTN, HLD, presented to neurosurgery outpatient office today for worsening facial pain. Pt started to experience L sided facial pain in 2018 after a tooth extraction and outpatient workup with MRI showed trigeminal neuralgia. Pt had rhizotomy in 2019 with Dr. Mckay with some improvement of symptoms which returned 8 months later. She had a repeat rhizotomy in 12/2020 with Dr. Mckay with improved symptoms again. Starting about 1 week ago, she has been experiencing worsening L sided face pain and "electrical" feelings similar to prior trigeminal neuralgia symptoms. She is unable to eat due to pain. She was sent to ED for admission for rhizotomy with Dr. Gonzales on 7/10.  (08 Jul 2024 11:15)    Patient seen and examined at bedside.  Reports that in the past week her L facial pain has significantly worsened.  Electric in nature, similar to prior episodes but now has gotten to the point that it is limiting her ability to eat.  Otherwise no recent changes in her health.  Takes synthroid for hypothyroidism, amlodipine for HTN, Lipitor for HLD.  No known cardiac issues other than HTN.  Has never had adverse effect from anesthesia with her prior surgeries.  Walks with a cane but is able to do things like grocery shop or walk up stairs without chest pain, takes it slow limited by gait.    REVIEW OF SYSTEMS: see HPI    PAST MEDICAL & SURGICAL HISTORY:  Hypothyroid      HLD (hyperlipidemia)      GERD (gastroesophageal reflux disease)      Trigeminal neuralgia      Osteoporosis      Sensitive skin      S/P thyroidectomy  partial      S/P cholecystectomy        SOCIAL HISTORY:  Tobacco use: Nonsmoker  EtOH use:  None   Recreational drug use: None    MEDICATIONS:  MEDICATIONS  (STANDING):  amLODIPine   Tablet 2.5 milliGRAM(s) Oral daily  atorvastatin 20 milliGRAM(s) Oral at bedtime  carBAMazepine ER Capsule 300 milliGRAM(s) Oral daily  levothyroxine 25 MICROGram(s) Oral daily    MEDICATIONS  (PRN):  acetaminophen     Tablet .. 650 milliGRAM(s) Oral every 6 hours PRN Mild Pain (1 - 3)      ALLERGIES:  Allergies    No Known Allergies    Intolerances        VITAL SIGNS:  Vital Signs Last 24 Hrs  T(C): 36.8 (08 Jul 2024 12:31), Max: 37.1 (08 Jul 2024 09:35)  T(F): 98.2 (08 Jul 2024 12:31), Max: 98.7 (08 Jul 2024 09:35)  HR: 73 (08 Jul 2024 12:31) (72 - 73)  BP: 125/65 (08 Jul 2024 12:31) (125/65 - 149/67)  BP(mean): --  RR: 16 (08 Jul 2024 12:31) (16 - 17)  SpO2: 97% (08 Jul 2024 12:31) (95% - 97%)    Parameters below as of 08 Jul 2024 12:31  Patient On (Oxygen Delivery Method): room air        PHYSICAL EXAM:  Constitutional: NAD, elderly female, comfortable in bed.  HEENT: PERRLA, EOMI, no conjunctival pallor or scleral icterus, MMM, some facial asymmetry due to tense L jaw  Neck: Supple  Respiratory: CTA B/L. No w/r/r.   Cardiovascular: RRR, normal S1 and S2, no m/r/g.   Gastrointestinal: +BS, soft NTND, no guarding or rebound tenderness, no palpable masses   Extremities: wwp; no cyanosis, clubbing or edema.   Vascular: Pulses equal and strong throughout.   Neurological: AAOx3, strength and sensation intact throughout.   Skin: No gross skin abnormalities or rashes          LABS:                        14.4   5.67  )-----------( 275      ( 08 Jul 2024 10:13 )             44.6     07-08    140  |  100  |  17  ----------------------------<  100<H>  4.2   |  28  |  0.55    Ca    9.1      08 Jul 2024 10:13      PT/INR - ( 08 Jul 2024 10:03 )   PT: 10.2 sec;   INR: 0.89          PTT - ( 08 Jul 2024 10:03 )  PTT:30.6 sec  Urinalysis Basic - ( 08 Jul 2024 10:13 )    Color: x / Appearance: x / SG: x / pH: x  Gluc: 100 mg/dL / Ketone: x  / Bili: x / Urobili: x   Blood: x / Protein: x / Nitrite: x   Leuk Esterase: x / RBC: x / WBC x   Sq Epi: x / Non Sq Epi: x / Bacteria: x          CAPILLARY BLOOD GLUCOSE              RADIOLOGY & ADDITIONAL TESTS: Reviewed.

## 2024-07-08 NOTE — H&P ADULT - NSHPPHYSICALEXAM_GEN_ALL_CORE
Constitutional: NAD, resting comfortably in bed.   HEENT: Pupils equal, round, reactive to light. EOMI. Face symmetric, tongue midline.  Respiratory: No respiratory distress, lungs clear to auscultation bilaterally.   Cardiovascular: RRR, S1, S2.   Gastrointestinal: Abdomen soft, non tender, nondistended.  Neurological:  AAOX3. Opens eyes. Follows commands. Speech clear.  Cranial Nerves: II-XII intact  Motor: 5/5 power in b/l UE and LE  Sensation: intact to light touch in all extremities  Pronator Drift: none  Extremities: Warm, well perfused.  Wounds: none

## 2024-07-08 NOTE — H&P ADULT - PROBLEM SELECTOR PLAN 1
- MRI maria guadalupe  - pending OR on 7/10  - pain control  - needs medical clearance for OR - CT maria guadalupe  - OR for trigeminal rhizotomy pending medical clearance   - pain control  - needs medical clearance for OR

## 2024-07-08 NOTE — H&P ADULT - HISTORY OF PRESENT ILLNESS
78 year old female, PMH trigeminal neuralgia s/p rhizotomy, ostearthritis, HTN, HLD, presented to neurosurgery outpatient office today for worsening facial pain. Pt started to experience L sided facial pain in 2018 after a tooth extraction and outpatient workup with MRI showed trigeminal neuralgia. Pt had rhizotomy in 2019 with Dr. Mckay with some improvement of symptoms which returned 8 months later. She had a repeat rhizotomy in 12/2020 with Dr. Mckay with improved symptoms again. Starting about 1 week ago, she has been experiencing worsening L sided face pain and "electrical" feelings similar to prior trigeminal neuralgia symptoms. She is unable to eat due to pain. She was sent to ED for admission for rhizotomy with Dr. Gonzales on 7/10.  78 year old female, PMH trigeminal neuralgia s/p rhizotomy, ostearthritis, HTN, HLD, presented with severe worsening facial pain. Pt started to experience L sided facial pain in 2018 after a tooth extraction and outpatient workup with MRI showed trigeminal neuralgia. Pt had rhizotomy in 2019 with Dr. Mckay with some improvement of symptoms which returned 8 months later. She had a repeat rhizotomy in 12/2020 with Dr. Mckay with improved symptoms again. Starting about 1 week ago, she has been experiencing worsening L sided face pain and "electrical" feelings similar to prior trigeminal neuralgia symptoms. She is unable to eat or drink due to pain. She was sent to ED for admission for rhizotomy and pain management.

## 2024-07-08 NOTE — H&P ADULT - ASSESSMENT
78 year old female, PMH HTN, HLD, trigeminal neuralgia s/p 2 rhizotomies, presented to outpt office with worsening L sided facial pain. Sent to ED for admission for rhizotomy with Dr. Gonzales.  78 year old female severe left trigeminal neuralgia exacerbation resulting in failure to thrive due to pain and inability to take POs effectively

## 2024-07-08 NOTE — PATIENT PROFILE ADULT - FALL HARM RISK - HARM RISK INTERVENTIONS

## 2024-07-08 NOTE — CONSULT NOTE ADULT - ASSESSMENT
78F PMH trigeminal neuralgia s/p rhizotomy, ostearthritis, HTN, HLD, hypothyroidism, osteoporosis presenting with worsening facial pain, admitted to neurosurgery for planned rhizotomy on wednesday 7/10/24. Cardiology consulted for pre-operative assessment.     Review of studies:  EKG 7/8/24: NSR rate 73    Home medications: atorvastatin 20mg qd (prev on amlodipine 2.5mg qd however per patient being held due to low pressures)    Recommendations:    #pre-operative CV risk assessment   -EKG NSR, no ischemic changes   -patient euvolemic, warm and well-perfused on exam   -no history of CAD, MI or valvular disease   -METS of at least 4 however limited by gait impairment   -would obtain TSH, lipid panel and A1C  -can obtain non-urgent TTE however does not need to hold up procedure   -patient is low-intermediate risk for low risk procedure   -no further cardiac testing needed prior to planned procedure     Recommendations not final until attested by attending  78F PMH trigeminal neuralgia s/p rhizotomy, ostearthritis, HTN, HLD, hypothyroidism, osteoporosis presenting with worsening facial pain, admitted to neurosurgery for planned rhizotomy on wednesday 7/10/24. Cardiology consulted for pre-operative assessment.     Review of studies:  EKG 7/8/24: NSR rate 73    Home medications: atorvastatin 20mg qd (prev on amlodipine 2.5mg qd however per patient being held due to low pressures)    Recommendations:    #pre-operative CV risk assessment   -EKG NSR, no ischemic changes   -patient euvolemic, warm and well-perfused on exam   -no history of CAD, MI or valvular disease   -METS of at least 4 however limited by gait impairment   -would obtain TSH, lipid panel and A1C  -can obtain non-urgent TTE however does not need to hold up procedure   -patient is low-intermediate risk for low risk procedure   -no further cardiac testing needed prior to planned procedure   -c/w home atorvastatin     Recommendations not final until attested by attending  78F PMH trigeminal neuralgia s/p rhizotomy, ostearthritis, HTN, HLD, hypothyroidism, osteoporosis presenting with worsening facial pain, admitted to neurosurgery for planned rhizotomy on wednesday 7/10/24. Cardiology consulted for pre-operative assessment.     Review of studies:  EKG 7/8/24: NSR rate 73    Home medications: atorvastatin 20mg qd (prev on amlodipine 2.5mg qd however per patient being held due to low pressures)    Recommendations:    #pre-operative CV risk assessment   -EKG NSR, no ischemic changes   -patient euvolemic, warm and well-perfused on exam   -no history of CAD, MI or valvular disease   -METS of at least 4 however limited by gait impairment   -would obtain TSH, lipid panel and A1C  -can obtain non-urgent TTE however does not need to hold up procedure   -patient is intermediate risk for low risk procedure   -no further cardiac testing needed prior to planned procedure   -c/w home atorvastatin     Recommendations not final until attested by attending

## 2024-07-08 NOTE — ED ADULT NURSE NOTE - NSFALLUNIVINTERV_ED_ALL_ED
Bed/Stretcher in lowest position, wheels locked, appropriate side rails in place/Call bell, personal items and telephone in reach/Instruct patient to call for assistance before getting out of bed/chair/stretcher/Non-slip footwear applied when patient is off stretcher/[place] to call system/Physically safe environment - no spills, clutter or unnecessary equipment/Purposeful proactive rounding/Room/bathroom lighting operational, light cord in reach

## 2024-07-08 NOTE — CONSULT NOTE ADULT - SUBJECTIVE AND OBJECTIVE BOX
HPI: 78F PMH trigeminal neuralgia s/p rhizotomy, ostearthritis, HTN, HLD, hypothyroidism, osteoporosis presented to [facility] ED with severe worsening facial pain. Pt started to experience L sided facial pain in 2018 after a tooth extraction and outpatient workup with MRI showed trigeminal neuralgia. Pt had rhizotomy in 2019 with Dr. Mckay with some improvement of symptoms which returned 8 months later. She had a repeat rhizotomy in 12/2020 with Dr. Mckay with improved symptoms again. Starting about 1 week ago, she has been experiencing worsening L sided face pain and "electrical" feelings similar to prior trigeminal neuralgia symptoms. She is unable to eat or drink due to pain. She was sent to ED for admission for rhizotomy and pain management. CT head in ED negative. Patient was admitted to neurosurgical service for rhizotomy procedure.     Patient seen and examined at bedside. Reports she is feeling ok and denies any CP or SOB. Denies any history of CAD, CHF or other cardiac history. Able to walk 10 blocks without becoming SOB or developing CP however ambulation limited by gait issues. Reports she had a nuclear stress test and echo done within the last 5 years that were normal. Does report that she has not been taking her BP medication due to low BPs and lightheadedness.       PHYSICAL EXAM:    General: lying in bed in NAD   HEENT: NC/AT; anicteric sclera; MMM  Neck: supple, no JVD   Cardiovascular: +S1/S2, RRR, no murmurs   Respiratory: CTA B/L; no W/R/R  Extremities: WWP; no LE edema   Neurological: AAOx3    VITAL SIGNS:  Vital Signs Last 24 Hrs  T(C): 36.6 (08 Jul 2024 14:30), Max: 37.1 (08 Jul 2024 09:35)  T(F): 97.9 (08 Jul 2024 14:30), Max: 98.7 (08 Jul 2024 09:35)  HR: 71 (08 Jul 2024 14:30) (71 - 73)  BP: 149/73 (08 Jul 2024 14:30) (125/65 - 149/73)  BP(mean): --  RR: 18 (08 Jul 2024 14:30) (16 - 18)  SpO2: 96% (08 Jul 2024 14:30) (95% - 97%)    Parameters below as of 08 Jul 2024 14:30  Patient On (Oxygen Delivery Method): room air          MEDICATIONS:  MEDICATIONS  (STANDING):  amLODIPine   Tablet 2.5 milliGRAM(s) Oral daily  atorvastatin 20 milliGRAM(s) Oral at bedtime  carBAMazepine ER Capsule 300 milliGRAM(s) Oral daily  levothyroxine 25 MICROGram(s) Oral daily    MEDICATIONS  (PRN):  acetaminophen     Tablet .. 650 milliGRAM(s) Oral every 6 hours PRN Mild Pain (1 - 3)      ALLERGIES:  Allergies    No Known Allergies    Intolerances        LABS:                        14.4   5.67  )-----------( 275      ( 08 Jul 2024 10:13 )             44.6     07-08    140  |  100  |  17  ----------------------------<  100<H>  4.2   |  28  |  0.55    Ca    9.1      08 Jul 2024 10:13      PT/INR - ( 08 Jul 2024 10:03 )   PT: 10.2 sec;   INR: 0.89          PTT - ( 08 Jul 2024 10:03 )  PTT:30.6 sec  Urinalysis Basic - ( 08 Jul 2024 10:13 )    Color: x / Appearance: x / SG: x / pH: x  Gluc: 100 mg/dL / Ketone: x  / Bili: x / Urobili: x   Blood: x / Protein: x / Nitrite: x   Leuk Esterase: x / RBC: x / WBC x   Sq Epi: x / Non Sq Epi: x / Bacteria: x      CAPILLARY BLOOD GLUCOSE          RADIOLOGY & ADDITIONAL TESTS: Reviewed.

## 2024-07-08 NOTE — H&P ADULT - NSHPLABSRESULTS_GEN_ALL_CORE
< from: CT Head No Cont (07.08.24 @ 11:08) >    ACC: [id number] EXAM:  CT BRAIN   ORDERED BY: YVETTEELLEN FARRELL     PROCEDURE DATE:  07/08/2024          INTERPRETATION:  CT head without IV contrast    CLINICAL INFORMATION: Preoperative    TECHNIQUE: Contiguous axial 5 mm sections were obtained throughthe head.   Sagittal and coronal 2-D reformatted images were also obtained.   This   scan was performed using automatic exposure control (radiation dose   reduction software) to obtain a diagnostic image quality scan with   patient dose as low as reasonably achievable.    FINDINGS:   No previous examinations are available for review.    The brain demonstrates no abnormal attenuation.   No acute cerebral   cortical infarct is seen.  No intracranial hemorrhage is found.  No mass   effect is found in the brain.    The ventricles, sulci and basal cisterns appear unremarkable.    The orbits are unremarkable.  The paranasal sinuses are clear.  The nasal   cavity appears intact.  The nasopharynx is symmetric.  The central skull   base, petrous temporal bones and calvarium remain intact.      IMPRESSION:   Unremarkable head CT.    --- End of Report ---            NIDHI RIVERA MD; Attending Radiologist  This document has been electronically signed. Jul 8 2024 11:29AM    < end of copied text >

## 2024-07-08 NOTE — ED ADULT TRIAGE NOTE - SPO2 (%)
[address] [facility] [address] , [facility], [address] [place] [address]  [phone]

## 2024-07-08 NOTE — H&P ADULT - NSHPREVIEWOFSYSTEMS_GEN_ALL_CORE
General:	no recent illnesses, no recent wt gain/loss, no chills  Skin/Breast: no rash, lumps, new moles, erythema, tenderness  Ophthalmologic: no change in vision, diplopia, pain, redness, tearing, dry eyes	  ENMT: no hearing loss, tinnitus, ear pain, vertigo, nasal congestion, epistaxis, sore throat  Respiratory and Thorax: no coughing, wheezing, recent URI, shortness of breath	  Cardiovascular: no chest pain, SALGADO, leg swelling, irregular rhythm   Gastrointestinal: no nausea, vomiting, diarrhea, abd pain, bloody stool, heartburn  Genitourinary: no frequency, dysuria, hematuria  Musculoskeletal: no joint pain, no joint swelling, no tenderness  Neurological:	 see HPI  Psychiatric: no confusion, no anxiousness, no depression   Hematology/Lymphatics: no brusing, easy bleeding, LAD  Endocrine: no excess urination/thirst, heat/cold intolerance  Allergic/Immunologic: no urticaria, sneezing, recurrent infections General: no recent illnesses, no recent wt gain/loss, no chills  Skin/Breast: no rash, lumps, new moles, erythema, tenderness  Ophthalmologic: no change in vision, diplopia, pain, redness, tearing, dry eyes	  ENMT: no hearing loss, tinnitus, ear pain, vertigo, nasal congestion, epistaxis, sore throat  Respiratory and Thorax: no coughing, wheezing, recent URI, shortness of breath	  Cardiovascular: no chest pain, SALGADO, leg swelling, irregular rhythm   Gastrointestinal: no nausea, vomiting, diarrhea, abd pain, bloody stool, heartburn  Genitourinary: no frequency, dysuria, hematuria  Musculoskeletal: no joint pain, no joint swelling, no tenderness  Neurological: see HPI  Psychiatric: no confusion, no anxiousness, no depression   Hematology/Lymphatics: no bruising, easy bleeding, LAD  Endocrine: no excess urination/thirst, heat/cold intolerance  Allergic/Immunologic: no urticaria, sneezing, recurrent infections

## 2024-07-08 NOTE — ED PROVIDER NOTE - CLINICAL SUMMARY MEDICAL DECISION MAKING FREE TEXT BOX
Pt hds, here for intervention for trigeminal neuralgia, NAD, no active pain, states not wanting pain meds, disc w nsgy, will obtain preop labs, admit.

## 2024-07-08 NOTE — ED ADULT NURSE NOTE - CHIEF COMPLAINT QUOTE
Pt presents to ED C/O L sided facial pain x 1 month, dx TMJ sent by Dr. Gonzales for scheduled surgery Wed. [facility] family at bedside. [phone].

## 2024-07-08 NOTE — ED ADULT TRIAGE NOTE - CHIEF COMPLAINT QUOTE
Pt presents to ED C/O L sided facial pain x 1 month, dx TMJ sent by Dr. Gonzales for scheduled surgery Wed. [facility], family at bedside. [phone].

## 2024-07-08 NOTE — CONSULT NOTE ADULT - ASSESSMENT
77 yo F with hx of HTN, HLD, hypothyroidism, trigeminal neuralgia s/p 2 rhizotomies, presented to outpt office with worsening L sided facial pain planned for rhizotomy    ***INCOMPLETE    #Trigeminal neuralgia  - plan for rhizotomy with Dr Gonzales  - pain control per primary team    #Preop assessment  - EKG pending  ***    #HTN  - c/w home amlodipine 2.5mg    #HLD  - c/w home Lipitor    #Hypothyroidism  - c/w home synthroid 25mcg    #Osteoporosis  - hold home bisphosphonate, Ibandronate    #DVT ppx - SCDs  Dispo: pending eval post op 77 yo F with hx of HTN, HLD, hypothyroidism, trigeminal neuralgia s/p 2 rhizotomies, presented to outpt office with worsening L sided facial pain planned for rhizotomy    #Trigeminal neuralgia  - plan for rhizotomy with Dr Gonzales  - pain control per primary team    #Preop assessment  - RCRI score 0, class I risk.  Holloway score 0.4-0.7% JAVON risk  - EKG NSR without ischemic changes  - METs ~4  - pending Echo    #HTN  - c/w home amlodipine 2.5mg    #HLD  - c/w home Lipitor    #Hypothyroidism  - c/w home synthroid 25mcg    #Osteoporosis  - hold home bisphosphonate, Ibandronate    #DVT ppx - SCDs  Dispo: pending eval post op 77 yo F with hx of HTN, HLD, hypothyroidism, trigeminal neuralgia s/p 2 rhizotomies, presented to outpt office with worsening L sided facial pain planned for rhizotomy    #Trigeminal neuralgia  - plan for rhizotomy with Dr Gonzales  - pain control per primary team    #Preop assessment  - RCRI score 0, class I risk.  Holloway score 0.4-0.7% JAVON risk  - EKG NSR without ischemic changes  - METs ~4  - pending Echo  - see cards note for clearance    #HTN  - c/w home amlodipine 2.5mg    #HLD  - c/w home Lipitor    #Hypothyroidism  - c/w home synthroid 25mcg    #Osteoporosis  - hold home bisphosphonate, Ibandronate    #DVT ppx - SCDs  Dispo: pending eval post op

## 2024-07-08 NOTE — ED PROVIDER NOTE - OBJECTIVE STATEMENT
77 yo pmh htn, trigeminal neuralagia w L facial pain ongoing intermittently and worsening over the past week. Pt states she's  had prior intervention for trigeminal neuralgia, no f/c, difficulty breathing, chest pain, difficulty swallowing, vision changes. took medications last night.

## 2024-07-08 NOTE — CONSULT NOTE ADULT - ATTENDING COMMENTS
Patient is a 79 yo F with PMH Trigeminal Neuralgia s/p rhizotomy, ostearthritis, HTN, HLD, hypothyroidism, osteoporosis presenting with worsening facial pain, admitted to neurosurgery for planned rhizotomy on Wednesday 7/10/24. Cardiology consulted for pre-operative CV assessment and optimization.     Review of studies:  - TTE 07/08/2024: Normal left and right ventricular size and systolic function. Normal atria. Mild aortic regurgitation. No other significant valvular disease. No evidence of pulmonary hypertension. No pericardial effusion.  - EKG 7/8/24: NSR rate 73; No ischemic changes    Home medications: atorvastatin 20mg qd (prev on amlodipine 2.5mg qd however per patient being held due to low pressures)    # Pre-operative CV risk assessment and Optimization  - Patient has no known ASCVD. Shes never had an MI or prior CVA  - She was previously diagnosed with HTN; Medications have since been discontinued due to resulting Hypotension. Patient has remained normotensive off antiHTN agents in the last year  - She reports remote CV workup with stress test done over 5 years ago for evaluation of Dyspnea. She states workup was negative  - Performance status is borderline and limited by back pain. Patient reports gait issues which limits her but she can walk 4 blocks without exertional symptoms. ROS is negative for chest pain, palpitations, dizziness  - EKG reviewed showing NSR, no ischemic changes   - Echo reviewed with normal BIV function without significant valvular heart disease  - Clinically patient is warm, well perfused and compensated. JVP if to the clavicle and there is no lower extremity edema  - At this time, there are no active CV contraindications to proceeding with rhizotomy. Patient not in ACS, decompensated HF or with any critical valvular heart disease. Patient is considered at intermediate risk for low risk procedure   -No further CV testing is needed prior to planned procedure   - Would add on TSH, A1c and Lipid profile to am labs  - In the interim please continue with Lipitor 20 mg po daily.

## 2024-07-09 ENCOUNTER — TRANSCRIPTION ENCOUNTER (OUTPATIENT)
Age: 79
End: 2024-07-09

## 2024-07-09 LAB
ANION GAP SERPL CALC-SCNC: 11 MMOL/L — SIGNIFICANT CHANGE UP (ref 5–17)
APTT BLD: 28.4 SEC — SIGNIFICANT CHANGE UP (ref 24.5–35.6)
BLD GP AB SCN SERPL QL: NEGATIVE — SIGNIFICANT CHANGE UP
BUN SERPL-MCNC: 12 MG/DL — SIGNIFICANT CHANGE UP (ref 7–23)
CALCIUM SERPL-MCNC: 8.8 MG/DL — SIGNIFICANT CHANGE UP (ref 8.4–10.5)
CHLORIDE SERPL-SCNC: 103 MMOL/L — SIGNIFICANT CHANGE UP (ref 96–108)
CO2 SERPL-SCNC: 26 MMOL/L — SIGNIFICANT CHANGE UP (ref 22–31)
CREAT SERPL-MCNC: 0.53 MG/DL — SIGNIFICANT CHANGE UP (ref 0.5–1.3)
EGFR: 95 ML/MIN/1.73M2 — SIGNIFICANT CHANGE UP
GLUCOSE SERPL-MCNC: 91 MG/DL — SIGNIFICANT CHANGE UP (ref 70–99)
HCT VFR BLD CALC: 44.2 % — SIGNIFICANT CHANGE UP (ref 34.5–45)
HGB BLD-MCNC: 14.1 G/DL — SIGNIFICANT CHANGE UP (ref 11.5–15.5)
INR BLD: 0.89 — SIGNIFICANT CHANGE UP (ref 0.85–1.18)
MAGNESIUM SERPL-MCNC: 2.8 MG/DL — HIGH (ref 1.6–2.6)
MCHC RBC-ENTMCNC: 30.1 PG — SIGNIFICANT CHANGE UP (ref 27–34)
MCHC RBC-ENTMCNC: 31.9 GM/DL — LOW (ref 32–36)
MCV RBC AUTO: 94.4 FL — SIGNIFICANT CHANGE UP (ref 80–100)
NRBC # BLD: 0 /100 WBCS — SIGNIFICANT CHANGE UP (ref 0–0)
PHOSPHATE SERPL-MCNC: 3.9 MG/DL — SIGNIFICANT CHANGE UP (ref 2.5–4.5)
PLATELET # BLD AUTO: 260 K/UL — SIGNIFICANT CHANGE UP (ref 150–400)
POTASSIUM SERPL-MCNC: 4 MMOL/L — SIGNIFICANT CHANGE UP (ref 3.5–5.3)
POTASSIUM SERPL-SCNC: 4 MMOL/L — SIGNIFICANT CHANGE UP (ref 3.5–5.3)
PROTHROM AB SERPL-ACNC: 10.2 SEC — SIGNIFICANT CHANGE UP (ref 9.5–13)
RBC # BLD: 4.68 M/UL — SIGNIFICANT CHANGE UP (ref 3.8–5.2)
RBC # FLD: 13.2 % — SIGNIFICANT CHANGE UP (ref 10.3–14.5)
RH IG SCN BLD-IMP: POSITIVE — SIGNIFICANT CHANGE UP
SODIUM SERPL-SCNC: 140 MMOL/L — SIGNIFICANT CHANGE UP (ref 135–145)
WBC # BLD: 4.56 K/UL — SIGNIFICANT CHANGE UP (ref 3.8–10.5)
WBC # FLD AUTO: 4.56 K/UL — SIGNIFICANT CHANGE UP (ref 3.8–10.5)

## 2024-07-09 PROCEDURE — 99222 1ST HOSP IP/OBS MODERATE 55: CPT | Mod: 1L

## 2024-07-09 PROCEDURE — 99233 SBSQ HOSP IP/OBS HIGH 50: CPT

## 2024-07-09 RX ORDER — POVIDONE-IODINE
1 FLAKES (GRAM) MISCELLANEOUS ONCE
Refills: 0 | Status: COMPLETED | OUTPATIENT
Start: 2024-07-10 | End: 2024-07-10

## 2024-07-09 RX ORDER — ACETAMINOPHEN 325 MG
1000 TABLET ORAL ONCE
Refills: 0 | Status: COMPLETED | OUTPATIENT
Start: 2024-07-10 | End: 2024-07-10

## 2024-07-09 RX ORDER — SODIUM CHLORIDE 0.9 % (FLUSH) 0.9 %
1000 SYRINGE (ML) INJECTION
Refills: 0 | Status: DISCONTINUED | OUTPATIENT
Start: 2024-07-09 | End: 2024-07-10

## 2024-07-09 RX ORDER — APREPITANT 125MG-80MG
40 KIT ORAL ONCE
Refills: 0 | Status: COMPLETED | OUTPATIENT
Start: 2024-07-10 | End: 2024-07-10

## 2024-07-09 RX ADMIN — AMLODIPINE BESYLATE 2.5 MILLIGRAM(S): 2.5 TABLET ORAL at 05:58

## 2024-07-09 RX ADMIN — Medication 1 APPLICATION(S): at 21:23

## 2024-07-09 RX ADMIN — ATORVASTATIN CALCIUM 20 MILLIGRAM(S): 20 TABLET, FILM COATED ORAL at 21:23

## 2024-07-09 RX ADMIN — CARBAMAZEPINE 300 MILLIGRAM(S): 200 TABLET ORAL at 21:23

## 2024-07-09 RX ADMIN — Medication 25 MICROGRAM(S): at 05:05

## 2024-07-09 NOTE — PROGRESS NOTE ADULT - ASSESSMENT
77 yo F with hx of HTN, HLD, hypothyroidism, trigeminal neuralgia s/p 2 rhizotomies, presented to outpt office with worsening L sided facial pain planned for rhizotomy    #Trigeminal neuralgia  - plan for rhizotomy with Dr Gonzales   - pain control per primary team  -Will continue carBAMazepine ER Capsule 300mg daily     #Preop assessment  - RCRI score 0, class I risk.  Holloway score 0.4-0.7% JAVON risk  - EKG NSR without ischemic changes  - METs ~4  - Pt s/p TTE on 7/8 which was WNL 9See official report above)   - Pt was seen by cardiology for pre op clearance on 7/8 and per cardiology the pt is intermediate risk for low risk procedure   -Per cardiology there is no need  further cardiac testing needed prior to planned procedure.      #HTN  -Continue  home amlodipine 2.5mg daily     #HLD  - c/w home Lipitor    #Hypothyroidism  - c/w home synthroid 25mcg    #Osteoporosis  - hold home bisphosphonate, Ibandronate    #DVT ppx   - Continue SCDs    #DISPO  - pending PT eval post op    50 minutes spent on total encounter. The necessity of the time spent during the encounter on this date of service was due to:    Review of hospital course, labs, vitals, medical records.  Bedside exam and interview of the patient with the assistance of the Priscilla hustoner  Discussed plan of care with primary team   Documenting the encounter.

## 2024-07-09 NOTE — DISCHARGE NOTE PROVIDER - HOSPITAL COURSE
HPI:  78 year old female, PMH HTN, HLD, trigeminal neuralgia s/p 2 rhizotomies, presented to outpt office with worsening L sided facial pain. Sent to ED for admission for rhizotomy with Dr. Gonzales. S/p ___________      Hospital Course:  7/8: Admitted from ED. Pending medical clearance. SQL tonight  7/9: JUAN    Patient evaluated by PT/OT who recommended:  Patient is going home? rehab? hospice? Facility Name:     Hospital course c/b:     Exam on day of discharge:    Checklist:   - Obtained follow up appointment from NP  - Reviewed final recommendations of inpatient consults  - review discharge planning on provider handoff  - post op imaging completed  - Neurologically stable for discharge  - Vitals stable for discharge   - Afebrile for discharge  - WBC is stable  - Sodium level is normal  - Pain is adequately controlled  - Pt has PICC/walker/brace/collar   - LACE score (10 or > needs PCP apt)   - stroke patient? Discharge NIHSS score   HPI:  78 year old female, PMH trigeminal neuralgia s/p rhizotomy, ostearthritis, HTN, HLD, presented to neurosurgery outpatient office today for worsening facial pain. Pt started to experience L sided facial pain in 2018 after a tooth extraction and outpatient workup with MRI showed trigeminal neuralgia. Pt had rhizotomy in 2019 with Dr. Mckay with some improvement of symptoms which returned 8 months later. She had a repeat rhizotomy in 12/2020 with Dr. Mckay with improved symptoms again. Starting about 1 week ago, she has been experiencing worsening L sided face pain and "electrical" feelings similar to prior trigeminal neuralgia symptoms. She is unable to eat due to pain. She was sent to ED for admission for rhizotomy with Dr. Gonzales on 7/10.       Hospital Course:  7/8: Admitted from ED. Pending medical clearance. SQL tonight  7/9: JUAN, pre-op OR tomorrow  7/10: JUAN, OR today  POD 0 s/p L percutaneous trigeminal rhizotomy.     Patient is going home.    Hospital course uncomplicated.    Exam on day of discharge:  Constitutional: NAD, resting comfortably in bed.   HEENT: Pupils equal, round, reactive to light. EOMI. Face symmetric, tongue midline.  Respiratory: No respiratory distress, lungs clear to auscultation bilaterally.   Cardiovascular: RRR, S1, S2.   Gastrointestinal: Abdomen soft, non tender, nondistended.  Neurological:  AAOX3. Opens eyes. Follows commands. Speech clear.  Cranial Nerves: II-XII intact  Motor: 5/5 power in b/l UE and LE  Sensation: +paresthesias to V3 portion of face on left side to light touch, o/w sensation intact to light touch in all extremities  Pronator Drift: none  Extremities: Warm, well perfused.    Patient is neurologically stable with stable vital signs, pain is adequately controlled, medically ready for discharge home.        HPI:  78 year old female, PMH trigeminal neuralgia s/p rhizotomy, ostearthritis, HTN, HLD, presented to neurosurgery outpatient office today for worsening facial pain. Pt started to experience L sided facial pain in 2018 after a tooth extraction and outpatient workup with MRI showed trigeminal neuralgia. Pt had rhizotomy in 2019 with Dr. Mckay with some improvement of symptoms which returned 8 months later. She had a repeat rhizotomy in 12/2020 with Dr. Mckay with improved symptoms again. Starting about 1 week ago, she has been experiencing worsening L sided face pain and "electrical" feelings similar to prior trigeminal neuralgia symptoms. She is unable to eat due to pain. She was sent to ED for admission for rhizotomy with Dr. Gonzales on 7/10.       Hospital Course:  7/8: Admitted from ED. Pending medical clearance. SQL tonight  7/9: JUAN, pre-op OR tomorrow  7/10: JUAN, OR today  POD 0 s/p L percutaneous trigeminal rhizotomy.   7/11: POD 1 s/p L rhizotomy. JUAN overnight, neuro stable.     Patient is going home.    Hospital course uncomplicated.    Exam on day of discharge:  Constitutional: NAD, resting comfortably in bed.   HEENT: Pupils equal, round, reactive to light. EOMI. Face symmetric, tongue midline.  Respiratory: No respiratory distress, lungs clear to auscultation bilaterally.   Cardiovascular: RRR, S1, S2.   Gastrointestinal: Abdomen soft, non tender, nondistended.  Neurological:  AAOX3. Opens eyes. Follows commands. Speech clear.  Cranial Nerves: II-XII intact  Motor: 5/5 power in b/l UE and LE  Sensation: +paresthesias to V3 portion of face on left side to light touch, o/w sensation intact to light touch in all extremities  Pronator Drift: none  Extremities: Warm, well perfused.    Patient is neurologically stable with stable vital signs, pain is adequately controlled, medically ready for discharge home.        HPI:  78 year old female, PMH trigeminal neuralgia s/p rhizotomy, ostearthritis, HTN, HLD, presented to neurosurgery outpatient office today for worsening facial pain. Pt started to experience L sided facial pain in 2018 after a tooth extraction and outpatient workup with MRI showed trigeminal neuralgia. Pt had rhizotomy in 2019 with Dr. Mckay with some improvement of symptoms which returned 8 months later. She had a repeat rhizotomy in 12/2020 with Dr. Mckay with improved symptoms again. Starting about 1 week ago, she has been experiencing worsening L sided face pain and "electrical" feelings similar to prior trigeminal neuralgia symptoms. She is unable to eat due to pain. She was sent to ED for admission for rhizotomy with Dr. Gonzales on 7/10. Now s/p L percutaneous trigeminal rhizotomy (7/10).        Hospital Course:  7/8: Admitted from ED. Pending medical clearance. SQL tonight  7/9: JUAN, pre-op OR tomorrow  7/10: JUAN, OR today  POD 0 s/p L percutaneous trigeminal rhizotomy.   7/11: POD 1 s/p L rhizotomy. JUAN overnight, neuro stable.     Patient is going home.    Hospital course uncomplicated.    Exam on day of discharge:  Constitutional: NAD, resting comfortably in bed.   HEENT: Pupils equal, round, reactive to light. EOMI. Face symmetric, tongue midline.  Respiratory: No respiratory distress, lungs clear to auscultation bilaterally.   Cardiovascular: RRR, S1, S2.   Gastrointestinal: Abdomen soft, non tender, nondistended.  Neurological:  AAOX3. Opens eyes. Follows commands. Speech clear.  Cranial Nerves: II-XII intact  Motor: 5/5 power in b/l UE and LE  Sensation: +paresthesias to V3 portion of face on left side to light touch, o/w sensation intact to light touch in all extremities  Pronator Drift: none  Extremities: Warm, well perfused.    Patient is neurologically stable with stable vital signs, pain is adequately controlled, medically ready for discharge home.

## 2024-07-09 NOTE — DISCHARGE NOTE PROVIDER - NSDCCPTREATMENT_GEN_ALL_CORE_FT
PRINCIPAL PROCEDURE  Procedure: Rhizotomy, nerve, trigeminal, with stereotactic guidance  Findings and Treatment:      PRINCIPAL PROCEDURE  Procedure: Rhizotomy, nerve, trigeminal, with stereotactic guidance  Findings and Treatment: s/p L percutaneous trigeminal rhizotomy (7/10).

## 2024-07-09 NOTE — PROGRESS NOTE ADULT - SUBJECTIVE AND OBJECTIVE BOX
HPI:  78 year old female, PMH trigeminal neuralgia s/p rhizotomy, ostearthritis, HTN, HLD, presented with severe worsening facial pain. Pt started to experience L sided facial pain in 2018 after a tooth extraction and outpatient workup with MRI showed trigeminal neuralgia. Pt had rhizotomy in 2019 with Dr. Mckay with some improvement of symptoms which returned 8 months later. She had a repeat rhizotomy in 12/2020 with Dr. Mckay with improved symptoms again. Starting about 1 week ago, she has been experiencing worsening L sided face pain and "electrical" feelings similar to prior trigeminal neuralgia symptoms. She is unable to eat or drink due to pain. She was sent to ED for admission for rhizotomy and pain management. (08 Jul 2024 11:15)    INTERVAL EVENTS: JUAN    Hospital course:   7/8: Admitted from ED. Pending medical clearance. SQL tonight  7/9: JUAN    Vital Signs Last 24 Hrs  T(C): 36.6 (08 Jul 2024 20:30), Max: 37.1 (08 Jul 2024 09:35)  T(F): 97.8 (08 Jul 2024 20:30), Max: 98.7 (08 Jul 2024 09:35)  HR: 78 (08 Jul 2024 20:30) (71 - 78)  BP: 123/67 (08 Jul 2024 20:30) (123/67 - 149/73)  BP(mean): --  RR: 16 (08 Jul 2024 20:30) (16 - 18)  SpO2: 95% (08 Jul 2024 20:30) (95% - 97%)    Parameters below as of 08 Jul 2024 20:30  Patient On (Oxygen Delivery Method): room air        I&O's Summary      PHYSICAL EXAM:  Constitutional: NAD, resting comfortably in bed.   HEENT: Pupils equal, round, reactive to light. EOMI. Face symmetric, tongue midline.  Respiratory: No respiratory distress, lungs clear to auscultation bilaterally.   Cardiovascular: RRR, S1, S2.   Gastrointestinal: Abdomen soft, non tender, nondistended.  Neurological:  AAOX3. Opens eyes. Follows commands. Speech clear.  Cranial Nerves: II-XII intact  Motor: 5/5 power in b/l UE and LE  Sensation: +paresthesias to V3 portion of face on left side to light touch, o/w sensation intact to light touch in all extremities  Pronator Drift: none  Extremities: Warm, well perfused.  Wounds: none    LABS:                        14.4   5.67  )-----------( 275      ( 08 Jul 2024 10:13 )             44.6     07-08    140  |  100  |  17  ----------------------------<  100<H>  4.2   |  28  |  0.55    Ca    9.1      08 Jul 2024 10:13      PT/INR - ( 08 Jul 2024 10:03 )   PT: 10.2 sec;   INR: 0.89          PTT - ( 08 Jul 2024 10:03 )  PTT:30.6 sec  Urinalysis Basic - ( 08 Jul 2024 10:13 )    Color: x / Appearance: x / SG: x / pH: x  Gluc: 100 mg/dL / Ketone: x  / Bili: x / Urobili: x   Blood: x / Protein: x / Nitrite: x   Leuk Esterase: x / RBC: x / WBC x   Sq Epi: x / Non Sq Epi: x / Bacteria: x          CAPILLARY BLOOD GLUCOSE          Drug Levels: [] N/A    CSF Analysis: [] N/A      Allergies    No Known Allergies    Intolerances        Home Medications:  amLODIPine 2.5 mg oral tablet: 1 tab(s) orally once a day (08 Jul 2024 11:23)  atorvastatin 20 mg oral tablet: 1 tab(s) orally once a day (08 Jul 2024 11:23)  carBAMazepine 300 mg oral capsule, extended release: 1 cap(s) orally once a day (08 Jul 2024 11:24)  ibandronate 150 mg oral tablet: 1 tab(s) orally once a day (08 Jul 2024 11:24)  levothyroxine 25 mcg (0.025 mg) oral capsule: 1 cap(s) orally once a day (08 Jul 2024 11:24)      MEDICATIONS:  MEDICATIONS  (STANDING):  amLODIPine   Tablet 2.5 milliGRAM(s) Oral daily  atorvastatin 20 milliGRAM(s) Oral at bedtime  carBAMazepine ER Capsule 300 milliGRAM(s) Oral daily  levothyroxine 25 MICROGram(s) Oral daily    MEDICATIONS  (PRN):  acetaminophen     Tablet .. 650 milliGRAM(s) Oral every 6 hours PRN Mild Pain (1 - 3)      CULTURES:      RADIOLOGY & ADDITIONAL TESTS:      ASSESSMENT:  78 year old female, PMH HTN, HLD, trigeminal neuralgia s/p 2 rhizotomies, presented to outpt office with worsening L sided facial pain. Sent to ED for admission for rhizotomy with Dr. Gonzales.     Neuro:  - neuro/vitals q8  - CT maria guadalupe complete 7/8  - pain control w/ tylenol and home carbamazepine 300mg qd  - med clearance obtained    Cardiac:  - SBP <160  - Echo (7/8): EF 70-75%  - HTN: cont amlodipine 2.5 mg qd  - HLD: continue atorvastatin 20mg qd  - cardiac clearance obtained    Pulm:  - RA    GI:  - regular diet  - pending BM    Renal:  - IVL  - voiding    Endo:  - hypothyroid: cont synthroid 25mcg    Heme:  - SCDs, SQL    ID:  - afebrile    Dispo: regional, full code, pending OR    Discussed with Dr. Gonzales    Assessment: present when checked     [] GCS   E   V   M     Heart Failure: [] Acute, [] acute on chronic, [] chronic   Heart Failure: [] Diastolic (HFpEF), [] Systolic (HRrEF), [] Combined (HFpEF and HFrEF), [] RHF, [] Pulm HTN, [] Other     [] JOSE, [] ATN, [] AIN, [] other   [] CKD1, [] CKD2, [] CKD3, [] CKD4, [] CKD5, [] ESRD     Encephalopathy: [] Metabolic, [] Hepatic, [] Toxic, [] Neurological, [] Other     Abnormal Nutritional Status: [] malnutrition (see nutrition note), []underweight: BMI <19, [] morbid obesity: BMI >40, [] Cachexia     [] Sepsis   [] Hypovolemic shock, [] Cardiogenic shock, [] Hemorrhagic shock, [] Neurogenic shock   [] Acute respiratory failure   [] Cerebral edema, [] Brain compression / herniation   [] Functional quadriplegia   [] Acute blood loss anemia

## 2024-07-09 NOTE — DISCHARGE NOTE PROVIDER - NSDCCPCAREPLAN_GEN_ALL_CORE_FT
PRINCIPAL DISCHARGE DIAGNOSIS  Diagnosis: Trigeminal neuralgia  Assessment and Plan of Treatment:       SECONDARY DISCHARGE DIAGNOSES  Diagnosis: Osteoporosis  Assessment and Plan of Treatment:     Diagnosis: HLD (hyperlipidemia)  Assessment and Plan of Treatment:     Diagnosis: Hypothyroid  Assessment and Plan of Treatment:     Diagnosis: GERD (gastroesophageal reflux disease)  Assessment and Plan of Treatment:     Diagnosis: Sensitive skin  Assessment and Plan of Treatment:     Diagnosis: S/P cholecystectomy  Assessment and Plan of Treatment:      PRINCIPAL DISCHARGE DIAGNOSIS  Diagnosis: Trigeminal neuralgia  Assessment and Plan of Treatment: s/p L percutaneous trigeminal rhizotomy (7/10).        SECONDARY DISCHARGE DIAGNOSES  Diagnosis: Osteoporosis  Assessment and Plan of Treatment:     Diagnosis: HLD (hyperlipidemia)  Assessment and Plan of Treatment:     Diagnosis: Hypothyroid  Assessment and Plan of Treatment:     Diagnosis: GERD (gastroesophageal reflux disease)  Assessment and Plan of Treatment:     Diagnosis: Sensitive skin  Assessment and Plan of Treatment:     Diagnosis: S/P cholecystectomy  Assessment and Plan of Treatment:

## 2024-07-09 NOTE — DISCHARGE NOTE PROVIDER - NSDCFUADDINST_GEN_ALL_CORE_FT
Neurosurgery follow up appointment date/time:  - are staples/sutures in place?  - what day should staples/sutures be removed (POD 10-14)?  - please call the office to confirm appointment:     Wound Care:  - can patient shower?  - does dressing need to be changed/removed?  - no picking at incision  - wears glasses?   - pressure ulcer?     Devices:  - does patient need collar or brace or helmet?   - does collar/brace need to be worn at all times or just when OOB?  - RW or cane for ambulation?    Drains/Lines:  - PICC in place? ID follow up? (Paper Rx for: antibiotics, heparin flush, weekly lab draws)  - ALVAREZ in place? Management?  - martines in place? Management/Urology follow up?  - Tracheostomy?  - PEG tube?      Activity:  - fatigue is common after surgery, rest if you feel tired   - no bending, lifting, twisting or heavy lifting   - walking is recommended, ambulate as tolerated  - you may shower when you get home, keep your incision dry  - no soaking in a tub/pool/hot tub   - no driving within 24 hours of anesthesia or while taking prescription pain medications   - keep hydrated, drink plenty of water   - skullbase precautions: no nose blowing, sneeze with mouth open, no drinking out of a straw, no straining      Inpatient consults:  - final recommendations  - you will need follow up with....    Please also follow up with your primary care doctor.     Pain Expectations:  - pain after surgery is expected  - please take pain meds as prescribed     Medications:  - Continue the following medications:   -Pain Medications:   - pain medications can cause constipation, you should eat a high fiber diet and may take a stool softener while on pain meds   - Avoid taking Advil (ibuprofen), Motrin (naproxen), or Aspirin for pain as they can cause bleeding     Call the office or come to ED if:  - wound has drainage or bleeding, increased redness or pain at incision site, neurological change, fever (>101), chills, night sweats, syncope, nausea/vomiting, chest pain, shortness of breath      Playback:  - Your discharge instructions are on [facility]back [facility]ve for your convenience.     WITHIN 24 HOURS OF DISCHARGE, PLEASE CONTACT NEURO PA  WITH ANY QUESTIONS OR CONCERNS: [phone]   OTHERWISE, PLEASE CALL THE OFFICE WITH ANY QUESTIONS OR CONCERNS: [phone]. Neurosurgery follow up appointment date/time:  Do not bath for 2 days, after 2 days can shower. Do not scrub incision, allow water to wash over the incision. Do not submerge in water for 14 days. Do not remove steri-strips they will fall off. Call the office with any redness, burning, drainage, wound opening, worsening pain, redness, fevers.  - please call the office to confirm appointment at [phone]    Activity:  - fatigue is common after surgery, rest if you feel tired   - walking is recommended, ambulate as tolerated  - no soaking in a tub/pool/hot tub   - no driving within 24 hours of anesthesia or while taking prescription pain medications   - keep hydrated, drink plenty of water   Please also follow up with your primary care doctor.     Pain Expectations:  - pain after surgery is expected  - please take pain meds as prescribed: Tylenol 1000mg every 8 hours as needed for mild to moderate pain. Percocet 5mg-325mg 1 tablet every 4-6 hours as needed for severe pain.    Medications:  -New medication: Dexamethasone taper, take 4mg every 8 hours on day 1 (7/10), then take 2mg every 8 hours on day 2 (7/11), then take 1mg every 8 hours on day 3 (7/12). Do not take anymore tablets on Day 4.   - Continue the following home medications as prescribed: carbamazepine, amlodipine, Lipitor, synthroid, ibandronate  - pain medications can cause constipation, you should eat a high fiber diet and may take a stool softener while on pain meds   - Avoid taking Advil (ibuprofen), Motrin (naproxen), or Aspirin for pain as they can cause bleeding     Call the office or come to ED if:  - wound has drainage or bleeding, increased redness or pain at incision site, neurological change, fever (>101), chills, night sweats, syncope, nausea/vomiting, chest pain, shortness of breath      WITHIN 24 HOURS OF DISCHARGE, PLEASE CONTACT NEURO PA  WITH ANY QUESTIONS OR CONCERNS: [phone]   OTHERWISE, PLEASE CALL THE OFFICE WITH ANY QUESTIONS OR CONCERNS: [phone]. Neurosurgery follow up appointment date/time:  Gauze and tegaderm dressing can be removed tomorrow. Do not bath for 2 days, after 2 days can shower. Do not scrub incision, allow water to wash over the incision. Do not submerge in water for 14 days. Do not remove steri-strips they will fall off on their own. Call the office with any redness, burning, drainage, wound opening, worsening pain, redness, fevers.  - please call the office to confirm appointment at [phone]    Activity:  - fatigue is common after surgery, rest if you feel tired   - walking is recommended, ambulate as tolerated  - no soaking in a tub/pool/hot tub   - no driving within 24 hours of anesthesia or while taking prescription pain medications   - keep hydrated, drink plenty of water   Please also follow up with your primary care doctor.     Pain Expectations:  - pain after surgery is expected  - please take pain meds as prescribed: Tylenol 1000mg every 8 hours as needed for mild to moderate pain. Percocet 5mg-325mg 1 tablet every 4-6 hours as needed for severe pain.    Medications:  -New medication: Dexamethasone taper, take 4mg every 8 hours on day 1 (7/10), then take 2mg every 8 hours on day 2 (7/11), then take 1mg every 8 hours on day 3 (7/12). Do not take anymore tablets on Day 4.   - Continue the following home medications as prescribed: carbamazepine, amlodipine, Lipitor, synthroid, ibandronate  - pain medications can cause constipation, you should eat a high fiber diet and may take a stool softener while on pain meds   - Avoid taking Advil (ibuprofen), Motrin (naproxen), or Aspirin for pain as they can cause bleeding     Call the office or come to ED if:  - wound has drainage or bleeding, increased redness or pain at incision site, neurological change, fever (>101), chills, night sweats, syncope, nausea/vomiting, chest pain, shortness of breath      WITHIN 24 HOURS OF DISCHARGE, PLEASE CONTACT NEURO PA  WITH ANY QUESTIONS OR CONCERNS: [phone]   OTHERWISE, PLEASE CALL THE OFFICE WITH ANY QUESTIONS OR CONCERNS: [phone]. Neurosurgery follow up appointment date/time:  Gauze and tegaderm dressing can be removed tomorrow. Do not bath for 2 days, after 2 days can shower. Do not scrub incision, allow water to wash over the incision. Do not submerge in water for 14 days. Do not remove steri-strips they will fall off on their own. Call the office with any redness, burning, drainage, wound opening, worsening pain, redness, fevers.  - please call the office to confirm appointment on 7/22/24 at 10AM at [phone]    Activity:  - fatigue is common after surgery, rest if you feel tired   - walking is recommended, ambulate as tolerated  - no soaking in a tub/pool/hot tub   - no driving within 24 hours of anesthesia or while taking prescription pain medications   - keep hydrated, drink plenty of water   Please also follow up with your primary care doctor.     Pain Expectations:  - pain after surgery is expected  - please take pain meds as prescribed: Tylenol 1000mg every 8 hours as needed for mild to moderate pain. Percocet 5mg-325mg 1 tablet every 4-6 hours as needed for severe pain.    Medications:  -New medication: Dexamethasone taper, take 4mg every 8 hours on day 1 (7/10), then take 2mg every 8 hours on day 2 (7/11), then take 1mg every 8 hours on day 3 (7/12). Do not take anymore tablets on Day 4.   - Continue the following home medications as prescribed: carbamazepine, amlodipine, Lipitor, synthroid, ibandronate  - pain medications can cause constipation, you should eat a high fiber diet and may take a stool softener while on pain meds   - Avoid taking Advil (ibuprofen), Motrin (naproxen), or Aspirin for pain as they can cause bleeding     Call the office or come to ED if:  - wound has drainage or bleeding, increased redness or pain at incision site, neurological change, fever (>101), chills, night sweats, syncope, nausea/vomiting, chest pain, shortness of breath      WITHIN 24 HOURS OF DISCHARGE, PLEASE CONTACT NEURO PA  WITH ANY QUESTIONS OR CONCERNS: [phone]   OTHERWISE, PLEASE CALL THE OFFICE WITH ANY QUESTIONS OR CONCERNS: [phone]. Neurosurgery follow up appointment date/time:  Gauze and tegaderm dressing can be removed tomorrow. Do not bath for 2 days, after 2 days can shower. Do not scrub incision, allow water to wash over the incision. Do not submerge in water for 14 days. Call the office with any redness, burning, drainage, wound opening, worsening pain, redness, fevers.  - please call the office to confirm appointment on 7/22/24 at 10AM at [phone]    Activity:  - fatigue is common after surgery, rest if you feel tired   - walking is recommended, ambulate as tolerated  - no soaking in a tub/pool/hot tub   - no driving within 24 hours of anesthesia or while taking prescription pain medications   - keep hydrated, drink plenty of water   Please also follow up with your primary care doctor.     Pain Expectations:  - pain after surgery is expected  - please take pain meds as prescribed: Tylenol 1000mg every 8 hours as needed for mild to moderate pain. Percocet 5mg-325mg 1 tablet every 4-6 hours as needed for severe pain.    Medications:  -New medication: Dexamethasone taper, take 4mg every 8 hours on day 1 (7/10), then take 2mg every 8 hours on day 2 (7/11), then take 1mg every 8 hours on day 3 (7/12). Do not take anymore tablets on Day 4. Pantoprazole 40mg daily while on dexamethasone taper for GI prophylaxis.  - Continue the following home medications as prescribed: carbamazepine, amlodipine, Lipitor, synthroid, ibandronate  - pain medications can cause constipation, you should eat a high fiber diet and may take a stool softener while on pain meds   - Avoid taking Advil (ibuprofen), Motrin (naproxen), or Aspirin for pain as they can cause bleeding     Call the office or come to ED if:  - wound has drainage or bleeding, increased redness or pain at incision site, neurological change, fever (>101), chills, night sweats, syncope, nausea/vomiting, chest pain, shortness of breath      WITHIN 24 HOURS OF DISCHARGE, PLEASE CONTACT NEURO PA  WITH ANY QUESTIONS OR CONCERNS: [phone]   OTHERWISE, PLEASE CALL THE OFFICE WITH ANY QUESTIONS OR CONCERNS: [phone]. Neurosurgery follow up appointment date/time:  -Gauze and tegaderm dressing can be removed today  -You may shower starting tomorrow, allow water to wash over the incision. Do not submerge in water for 14 days  - Call the office with any redness, burning, drainage, wound opening, worsening pain, redness, fevers.  - please call the office to confirm appointment on 7/22/24 at 10AM at [phone]    Activity:  - fatigue is common after surgery, rest if you feel tired   - walking is recommended, ambulate as tolerated  - no soaking in a tub/pool/hot tub   - no driving within 24 hours of anesthesia or while taking prescription pain medications   - keep hydrated, drink plenty of water   Please also follow up with your primary care doctor.     Pain Expectations:  - pain after surgery is expected  - please take pain meds as prescribed: Tylenol 1000mg every 8 hours as needed for mild to moderate pain. Percocet 5mg-325mg 1 tablet every 4-6 hours as needed for severe pain.    Medications:  -New medication: Dexamethasone taper,  take 2mg every 8 hours (7/11), then take 1mg every 8 hours  (7/12). Pantoprazole 40mg daily while on dexamethasone taper for GI prophylaxis.  - Continue the following home medications as prescribed: carbamazepine, amlodipine, Lipitor, synthroid, ibandronate  - pain medications can cause constipation, you should eat a high fiber diet and may take a stool softener while on pain meds   - Avoid taking Advil (ibuprofen), Motrin (naproxen), or Aspirin for pain as they can cause bleeding     Call the office or come to ED if:  - wound has drainage or bleeding, increased redness or pain at incision site, neurological change, fever (>101), chills, night sweats, syncope, nausea/vomiting, chest pain, shortness of breath      WITHIN 24 HOURS OF DISCHARGE, PLEASE CONTACT NEURO PA  WITH ANY QUESTIONS OR CONCERNS: [phone]   OTHERWISE, PLEASE CALL THE OFFICE WITH ANY QUESTIONS OR CONCERNS: [phone]. Neurosurgery follow up appointment date/time:  -Gauze and tegaderm dressing can be removed today  -You may shower starting tomorrow, allow water to wash over the incision. Do not submerge in water for 14 days  - Call the office with any redness, burning, drainage, wound opening, worsening pain, redness, fevers.  - please call the office to confirm appointment on 7/22/24 at 10AM at [phone]    Activity:  - fatigue is common after surgery, rest if you feel tired   - walking is recommended, ambulate as tolerated  - no soaking in a tub/pool/hot tub   - no driving within 24 hours of anesthesia or while taking prescription pain medications   - keep hydrated, drink plenty of water   Please also follow up with your primary care doctor.     Pain Expectations:  - pain after surgery is expected  - please take pain meds as prescribed: Tylenol 1000mg every 8 hours as needed for mild to moderate pain. Percocet 5mg-325mg 1 tablet every 4-6 hours as needed for severe pain. Do not exceed maximum daily Tylenol dose 4,000mg. (Your percoet pillls contain 325mg Tylenol).    Medications:  -New medication: Dexamethasone taper,  take 2mg every 8 hours (7/11), then take 1mg every 8 hours  (7/12). Pantoprazole 40mg daily while on dexamethasone taper for GI prophylaxis.  - Continue the following home medications as prescribed: carbamazepine, amlodipine, Lipitor, synthroid, ibandronate  - pain medications can cause constipation, you should eat a high fiber diet and may take a stool softener while on pain meds   - Avoid taking Advil (ibuprofen), Motrin (naproxen), or Aspirin for pain as they can cause bleeding     Call the office or come to ED if:  - wound has drainage or bleeding, increased redness or pain at incision site, neurological change, fever (>101), chills, night sweats, syncope, nausea/vomiting, chest pain, shortness of breath      WITHIN 24 HOURS OF DISCHARGE, PLEASE CONTACT NEURO PA  WITH ANY QUESTIONS OR CONCERNS: [phone]   OTHERWISE, PLEASE CALL THE OFFICE WITH ANY QUESTIONS OR CONCERNS: [phone].

## 2024-07-09 NOTE — PROGRESS NOTE ADULT - SUBJECTIVE AND OBJECTIVE BOX
Surgery:   Consent: Signed by patient vs HCP                   NAME/NUMBER of HCP:     Representative Consent: [  ] Signed by patient vs HCP                                                 [  ] N/A -> only for cerebral angiogram    No Known Allergies      OVERNIGHT EVENTS:    T(C): 36.5 (07-09-24 @ 05:23), Max: 37.1 (07-08-24 @ 09:35)  HR: 58 (07-09-24 @ 05:23) (58 - 78)  BP: 115/66 (07-09-24 @ 05:23) (115/66 - 149/73)  RR: 18 (07-09-24 @ 05:23) (16 - 18)  SpO2: 95% (07-09-24 @ 05:23) (95% - 97%)  Wt(kg): --    EXAM:      07-08    140  |  100  |  17  ----------------------------<  100<H>  4.2   |  28  |  0.55    Ca    9.1      08 Jul 2024 10:13      CBC Full  -  ( 08 Jul 2024 10:13 )  WBC Count : 5.67 K/uL  RBC Count : 4.72 M/uL  Hemoglobin : 14.4 g/dL  Hematocrit : 44.6 %  Platelet Count - Automated : 275 K/uL  Mean Cell Volume : 94.5 fl  Mean Cell Hemoglobin : 30.5 pg  Mean Cell Hemoglobin Concentration : 32.3 gm/dL  Auto Neutrophil # : 3.47 K/uL  Auto Lymphocyte # : 1.51 K/uL  Auto Monocyte # : 0.54 K/uL  Auto Eosinophil # : 0.09 K/uL  Auto Basophil # : 0.05 K/uL  Auto Neutrophil % : 61.2 %  Auto Lymphocyte % : 26.6 %  Auto Monocyte % : 9.5 %  Auto Eosinophil % : 1.6 %  Auto Basophil % : 0.9 %    PT/INR - ( 08 Jul 2024 10:03 )   PT: 10.2 sec;   INR: 0.89          PTT - ( 08 Jul 2024 10:03 )  PTT:30.6 sec    Pregnancy test (serum hcg for any female under 56, must be resulted day before OR): [ ] Negative Result  [ ] Positive Result  [ ] N/A : male or female>57 y/o  Type & Screen (in past 72hrs):     2 Type & Screen within 72 hours if anticipate blood need in OR:  _ Y _ N     Blood ordered and on hold for OR:   [ ] No need     [ ] 1u pRBC on hold      [ ] 2u pRBC on hold    COVID swab (in past 48hrs): _ Y  _N    CXR:  EKG:  ECHO:  Medical Clearances:  Other Clearances:     Last dose of antiplatelet/anticoagulation drug:    Implanted Devices (pacemaker, drug pump...etc):  []YES   [] NO                  If yes --> EPS consulted to interrogate device: [ ] YES  [ ] NO                            If yes -->  EPS called to let them know patient going for surgery: [ ] device needs to be turned off                                                                                                                                                 [ ] magnet needs to be placed for surgery                                                                                                                                                [ ] nothing to do per EP, may proceed with cautery use in OR                                       3M nasal swab ordered?  _Y  _N    Cranial surgery: Order written for hair to be shampooed night before surgery and morning before surgery  [] yes   []no  Chlorhexidine Wipes ordered for Neck Down?  _ Y  _ N  (twice a day if 1 day before surgery, daily for 3 days if 3 days prior, daily if in ICU)                 Assessment:    Plan:    Assessment:  Present when checked    []  GCS  E   V  M     Heart Failure: []Acute, [] acute on chronic , []chronic  Heart Failure:  [] Diastolic (HFpEF), [] Systolic (HFrEF), []Combined (HFpEF and HFrEF), [] RHF, [] Pulm HTN, [] Other    [] JOSE, [] ATN, [] AIN, [] other  [] CKD1, [] CKD2, [] CKD 3, [] CKD 4, [] CKD 5, []ESRD    Encephalopathy: [] Metabolic, [] Hepatic, [] toxic, [] Neurological, [] Other    Abnormal Nurtitional Status: [] malnurtition (see nutrition note), [ ]underweight: BMI < 19, [] morbid obesity: BMI >40, [] Cachexia    [] Sepsis  [] hypovolemic shock,[] cardiogenic shock, [] hemorrhagic shock, [] neuogenic shock  [] Acute Respiratory Failure  []Cerebral edema, [] Brain compression/ herniation,   [] Functional quadriplegia  [] Acute blood loss anemia   Surgery: Left percutaneous trigeminal rhizotomy   Consent: Signed by patient                    NAME/NUMBER of HCP:     Representative Consent: [  ] Signed by patient vs HCP                                                 [  ] N/A -> only for cerebral angiogram    No Known Allergies      OVERNIGHT EVENTS: JUAN overnight, neuro stable     T(C): 36.5 (24 @ 05:23), Max: 37.1 (24 @ 09:35)  HR: 58 (24 @ 05:23) (58 - 78)  BP: 115/66 (24 @ 05:23) (115/66 - 149/73)  RR: 18 (24 @ 05:23) (16 - 18)  SpO2: 95% (24 @ 05:23) (95% - 97%)  Wt(kg): --    EXAM:  PHYSICAL EXAM:  Constitutional: NAD, resting comfortably in bed.   HEENT: Pupils equal, round, reactive to light. EOMI. Face symmetric, tongue midline.  Respiratory: No respiratory distress, lungs clear to auscultation bilaterally.   Cardiovascular: RRR, S1, S2.   Gastrointestinal: Abdomen soft, non tender, nondistended.  Neurological:  AAOX3. Opens eyes. Follows commands. Speech clear.  Cranial Nerves: II-XII intact  Motor: 5/5 power in b/l UE and LE  Sensation: +paresthesias to V3 portion of face on left side to light touch, o/w sensation intact to light touch in all extremities  Pronator Drift: none  Extremities: Warm, well perfused.  Wounds: none          140  |  100  |  17  ----------------------------<  100<H>  4.2   |  28  |  0.55    Ca    9.1      2024 10:13      CBC Full  -  ( 2024 10:13 )  WBC Count : 5.67 K/uL  RBC Count : 4.72 M/uL  Hemoglobin : 14.4 g/dL  Hematocrit : 44.6 %  Platelet Count - Automated : 275 K/uL  Mean Cell Volume : 94.5 fl  Mean Cell Hemoglobin : 30.5 pg  Mean Cell Hemoglobin Concentration : 32.3 gm/dL  Auto Neutrophil # : 3.47 K/uL  Auto Lymphocyte # : 1.51 K/uL  Auto Monocyte # : 0.54 K/uL  Auto Eosinophil # : 0.09 K/uL  Auto Basophil # : 0.05 K/uL  Auto Neutrophil % : 61.2 %  Auto Lymphocyte % : 26.6 %  Auto Monocyte % : 9.5 %  Auto Eosinophil % : 1.6 %  Auto Basophil % : 0.9 %    PT/INR - ( 2024 10:03 )   PT: 10.2 sec;   INR: 0.89          PTT - ( 2024 10:03 )  PTT:30.6 sec    Pregnancy test (serum hcg for any female under 56, must be resulted day before OR): [ ] Negative Result  [ ] Positive Result  [x ] N/A : male or female>57 y/o  Type & Screen (in past 72hrs):     2 Type & Screen within 72 hours if anticipate blood need in OR: x _ Y _ N     Blood ordered and on hold for OR:   [ ] No need     [x ] 1u pRBC on hold      [ ] 2u pRBC on hold      Medically optimized for OR. Cardiac clearance obtained.      Last dose of antiplatelet/anticoagulation dru/8 @10PM SQL    Implanted Devices (pacemaker, drug pump...etc):  []YES   [] NO                  If yes --> EPS consulted to interrogate device: [ ] YES  [ ] NO                            If yes -->  EPS called to let them know patient going for surgery: [ ] device needs to be turned off                                                                                                                                                 [ ] magnet needs to be placed for surgery                                                                                                                                                [ ] nothing to do per EP, may proceed with cautery use in OR                                       3M nasal swab ordered?  x_Y  _N    Cranial surgery: Order written for hair to be shampooed night before surgery and morning before surgery  [x] yes   []no  Chlorhexidine Wipes ordered for Neck Down?  x_ Y  _ N  (twice a day if 1 day before surgery, daily for 3 days if 3 days prior, daily if in ICU)                 Assessment: 78 year old female, PMH HTN, HLD, trigeminal neuralgia s/p 2 rhizotomies, presented to outpt office with worsening L sided facial pain. Sent to ED for admission for rhizotomy with Dr. Gonzales.     Plan:    Neuro:  - neuro/vitals q8  - CT maria guadalupe complete   - pain control w/ tylenol and home carbamazepine 300mg qd  - med clearance obtained    Cardiac:  - SBP <160  - Echo (): EF 70-75%  - HTN: cont amlodipine 2.5 mg qd  - HLD: continue atorvastatin 20mg qd  - cardiac clearance obtained    Pulm:  - RA    GI:  - regular diet  - pending BM    Renal:  - IVL  - voiding    Endo:  - hypothyroid: cont synthroid 25mcg    Heme:  - SCDs, SQL held for OR     ID:  - afebrile    Dispo: regional, full code, pending OR    Discussed with Dr. Gonzales    Assessment:  Present when checked    []  GCS  E   V  M     Heart Failure: []Acute, [] acute on chronic , []chronic  Heart Failure:  [] Diastolic (HFpEF), [] Systolic (HFrEF), []Combined (HFpEF and HFrEF), [] RHF, [] Pulm HTN, [] Other    [] JOSE, [] ATN, [] AIN, [] other  [] CKD1, [] CKD2, [] CKD 3, [] CKD 4, [] CKD 5, []ESRD    Encephalopathy: [] Metabolic, [] Hepatic, [] toxic, [] Neurological, [] Other    Abnormal Nurtitional Status: [] malnurtition (see nutrition note), [ ]underweight: BMI < 19, [] morbid obesity: BMI >40, [] Cachexia    [] Sepsis  [] hypovolemic shock,[] cardiogenic shock, [] hemorrhagic shock, [] neuogenic shock  [] Acute Respiratory Failure  []Cerebral edema, [] Brain compression/ herniation,   [] Functional quadriplegia  [] Acute blood loss anemia

## 2024-07-09 NOTE — DISCHARGE NOTE PROVIDER - NSDCMRMEDTOKEN_GEN_ALL_CORE_FT
amLODIPine 2.5 mg oral tablet: 1 tab(s) orally once a day  atorvastatin 20 mg oral tablet: 1 tab(s) orally once a day  carBAMazepine 300 mg oral capsule, extended release: 1 cap(s) orally once a day  ibandronate 150 mg oral tablet: 1 tab(s) orally once a day  levothyroxine 25 mcg (0.025 mg) oral capsule: 1 cap(s) orally once a day   amLODIPine 2.5 mg oral tablet: 1 tab(s) orally once a day  atorvastatin 20 mg oral tablet: 1 tab(s) orally once a day  carBAMazepine 300 mg oral capsule, extended release: 1 cap(s) orally once a day  dexAMETHasone 1 mg oral tablet: 1 tab(s) orally every 8 hours On day 1, take 4 tablets every 8 hours.   On day 2, take 2 tablets every 8 hours.  On day 3, take 1 tablet every 8 hours  On day 4, do not take any more tablets MDD: 12 tablets  ibandronate 150 mg oral tablet: 1 tab(s) orally once a day  levothyroxine 25 mcg (0.025 mg) oral capsule: 1 cap(s) orally once a day  Percocet 5 mg-325 mg oral tablet: 1 tab(s) orally every 4 to 6 hours MDD: 4 tablets   amLODIPine 2.5 mg oral tablet: 1 tab(s) orally once a day  atorvastatin 20 mg oral tablet: 1 tab(s) orally once a day  carBAMazepine 300 mg oral capsule, extended release: 1 cap(s) orally once a day  dexAMETHasone 1 mg oral tablet: 1 tab(s) orally every 8 hours On day 1, take 2 tablets every 8 hours.    On day 2, take 1 tablet every 8 hours   On day 3, do not take any more tablets MDD: 12 tablets  ibandronate 150 mg oral tablet: 1 tab(s) orally once a day  levothyroxine 25 mcg (0.025 mg) oral capsule: 1 cap(s) orally once a day  pantoprazole 40 mg oral delayed release tablet: 1 tab(s) orally once a day (before a meal) take while taking decadron steroid  Percocet 5 mg-325 mg oral tablet: 1 tab(s) orally every 4 to 6 hours MDD: 4 tablets

## 2024-07-09 NOTE — DISCHARGE NOTE PROVIDER - CARE PROVIDER_API CALL
Issa Gonzales.  Neurosurgery  [address] [place] [address]  Phone: [phone]  Fax: [phone]  Follow Up Time:

## 2024-07-09 NOTE — DISCHARGE NOTE PROVIDER - NSDCFUADDAPPT_GEN_ALL_CORE_FT
Please follow up with Dr. Gonzales in the outpatient office. Call [phone] to confirm appointment date and time.     Please also follow up with your Primary Care Doctor.  Please follow up with Dr. Gonzales in the outpatient office on 7/22/24 at 10AM. Call [phone] to confirm appointment date and time.     Please also follow up with your Primary Care Doctor.

## 2024-07-09 NOTE — PROGRESS NOTE ADULT - SUBJECTIVE AND OBJECTIVE BOX
Patient was seen and examined at bedside. Case discuss with the primary team. Spoke to the patient with the assistance of the  ID #[id number] .    Pt with some jaw discomfort which she described as an electrically sensation. Pt with some leg cramping yesterday but it is improved today.     OBJECTIVE:  Vital Signs Last 24 Hrs  T(C): 36.4 (09 Jul 2024 08:59), Max: 36.8 (08 Jul 2024 12:31)  T(F): 97.5 (09 Jul 2024 08:59), Max: 98.2 (08 Jul 2024 12:31)  HR: 76 (09 Jul 2024 08:59) (58 - 78)  BP: 105/64 (09 Jul 2024 08:59) (105/64 - 149/73)  RR: 18 (09 Jul 2024 08:59) (16 - 18)  SpO2: 95% (09 Jul 2024 08:59) (95% - 97%)    Parameters below as of 09 Jul 2024 08:59  Patient On (Oxygen Delivery Method): room air    PHYSICAL EXAM:  Gen: NAD laying in bed  CV: RRR, +S1/S2, no mumur  Pulm: CTA b/l no wheezing or crackles   Abd: soft, NTND + BS no rebound or guarding   Ext: no calf tenderness or swelling or edema   Neuro: AAOX3; nonfocal     LABS:                        14.1   4.56  )-----------( 260      ( 09 Jul 2024 05:30 )             44.2     07-09    140  |  103  |  12  ----------------------------<  91  4.0   |  26  |  0.53    Ca    8.8      09 Jul 2024 05:30  Phos  3.9     07-09  Mg     2.8     07-09       PT: 10.2 sec;   INR: 0.89   PTT:30.6 sec    TTE:  1. Normal left and right ventricular size and systolic function.   2. Normal atria.   3. Mild aortic regurgitation.   4. No other significant valvular disease.   5. No evidence of pulmonary hypertension.   6. No pericardial effusion.   7. No prior echo is available for comparison.    acetaminophen     Tablet .. 650 milliGRAM(s) Oral every 6 hours PRN  amLODIPine   Tablet 2.5 milliGRAM(s) Oral daily  atorvastatin 20 milliGRAM(s) Oral at bedtime  carBAMazepine ER Capsule 300 milliGRAM(s) Oral daily  chlorhexidine 2% Cloths 1 Application(s) Topical <User Schedule>  levothyroxine 25 MICROGram(s) Oral daily  sodium chloride 0.9%. 1000 milliLiter(s) IV Continuous <Continuous>

## 2024-07-09 NOTE — DISCHARGE NOTE PROVIDER - NSDCFUSCHEDAPPT_GEN_ALL_CORE_FT
Issa Gonzales Physician [facility], [facility]  NEUROSURG [facility] [address]  Scheduled Appointment: 07/10/2024     Issa Gonzales Physician [facility]  NEUROSURG [address] [facility] [address]  Scheduled Appointment: 07/22/2024

## 2024-07-10 ENCOUNTER — APPOINTMENT (OUTPATIENT)
Dept: NEUROSURGERY | Facility: HOSPITAL | Age: 79
End: 2024-07-10

## 2024-07-10 LAB
ANION GAP SERPL CALC-SCNC: 12 MMOL/L — SIGNIFICANT CHANGE UP (ref 5–17)
BUN SERPL-MCNC: 12 MG/DL — SIGNIFICANT CHANGE UP (ref 7–23)
CALCIUM SERPL-MCNC: 8.6 MG/DL — SIGNIFICANT CHANGE UP (ref 8.4–10.5)
CHLORIDE SERPL-SCNC: 102 MMOL/L — SIGNIFICANT CHANGE UP (ref 96–108)
CO2 SERPL-SCNC: 24 MMOL/L — SIGNIFICANT CHANGE UP (ref 22–31)
CREAT SERPL-MCNC: 0.53 MG/DL — SIGNIFICANT CHANGE UP (ref 0.5–1.3)
EGFR: 95 ML/MIN/1.73M2 — SIGNIFICANT CHANGE UP
GLUCOSE BLDC GLUCOMTR-MCNC: 152 MG/DL — HIGH (ref 70–99)
GLUCOSE SERPL-MCNC: 107 MG/DL — HIGH (ref 70–99)
HCT VFR BLD CALC: 42.1 % — SIGNIFICANT CHANGE UP (ref 34.5–45)
HGB BLD-MCNC: 14.2 G/DL — SIGNIFICANT CHANGE UP (ref 11.5–15.5)
MAGNESIUM SERPL-MCNC: 2.3 MG/DL — SIGNIFICANT CHANGE UP (ref 1.6–2.6)
MCHC RBC-ENTMCNC: 31 PG — SIGNIFICANT CHANGE UP (ref 27–34)
MCHC RBC-ENTMCNC: 33.7 GM/DL — SIGNIFICANT CHANGE UP (ref 32–36)
MCV RBC AUTO: 91.9 FL — SIGNIFICANT CHANGE UP (ref 80–100)
NRBC # BLD: 0 /100 WBCS — SIGNIFICANT CHANGE UP (ref 0–0)
PHOSPHATE SERPL-MCNC: 3.3 MG/DL — SIGNIFICANT CHANGE UP (ref 2.5–4.5)
PLATELET # BLD AUTO: 255 K/UL — SIGNIFICANT CHANGE UP (ref 150–400)
POTASSIUM SERPL-MCNC: 3.5 MMOL/L — SIGNIFICANT CHANGE UP (ref 3.5–5.3)
POTASSIUM SERPL-SCNC: 3.5 MMOL/L — SIGNIFICANT CHANGE UP (ref 3.5–5.3)
RBC # BLD: 4.58 M/UL — SIGNIFICANT CHANGE UP (ref 3.8–5.2)
RBC # FLD: 13.3 % — SIGNIFICANT CHANGE UP (ref 10.3–14.5)
SODIUM SERPL-SCNC: 138 MMOL/L — SIGNIFICANT CHANGE UP (ref 135–145)
WBC # BLD: 5.26 K/UL — SIGNIFICANT CHANGE UP (ref 3.8–10.5)
WBC # FLD AUTO: 5.26 K/UL — SIGNIFICANT CHANGE UP (ref 3.8–10.5)

## 2024-07-10 PROCEDURE — 64610 INJECTION TREATMENT OF NERVE: CPT | Mod: LT

## 2024-07-10 PROCEDURE — 99232 SBSQ HOSP IP/OBS MODERATE 35: CPT

## 2024-07-10 PROCEDURE — 61782 SCAN PROC CRANIAL EXTRA: CPT

## 2024-07-10 RX ORDER — DEXAMETHASONE 1 MG/1
1 TABLET ORAL
Qty: 21 | Refills: 0
Start: 2024-07-10 | End: 2024-07-12

## 2024-07-10 RX ORDER — OXYCODONE HYDROCHLORIDE 100 MG/5ML
10 SOLUTION ORAL EVERY 4 HOURS
Refills: 0 | Status: DISCONTINUED | OUTPATIENT
Start: 2024-07-10 | End: 2024-07-11

## 2024-07-10 RX ORDER — DEXAMETHASONE 1 MG/1
TABLET ORAL
Refills: 0 | Status: DISCONTINUED | OUTPATIENT
Start: 2024-07-11 | End: 2024-07-11

## 2024-07-10 RX ORDER — BENZOCAINE AND MENTHOL 15; 3.6 MG/1; MG/1
1 LOZENGE ORAL ONCE
Refills: 0 | Status: COMPLETED | OUTPATIENT
Start: 2024-07-10 | End: 2024-07-10

## 2024-07-10 RX ORDER — DEXTROSE MONOHYDRATE AND SODIUM CHLORIDE 5; .3 G/100ML; G/100ML
1000 INJECTION, SOLUTION INTRAVENOUS
Refills: 0 | Status: DISCONTINUED | OUTPATIENT
Start: 2024-07-10 | End: 2024-07-10

## 2024-07-10 RX ORDER — OXYCODONE HYDROCHLORIDE 100 MG/5ML
5 SOLUTION ORAL EVERY 4 HOURS
Refills: 0 | Status: DISCONTINUED | OUTPATIENT
Start: 2024-07-10 | End: 2024-07-11

## 2024-07-10 RX ORDER — OXYCODONE AND ACETAMINOPHEN 5; 325 MG/1; MG/1
1 TABLET ORAL
Qty: 12 | Refills: 0
Start: 2024-07-10 | End: 2024-07-11

## 2024-07-10 RX ORDER — SODIUM CHLORIDE 0.9 % (FLUSH) 0.9 %
1000 SYRINGE (ML) INJECTION
Refills: 0 | Status: DISCONTINUED | OUTPATIENT
Start: 2024-07-10 | End: 2024-07-11

## 2024-07-10 RX ORDER — LABETALOL HYDROCHLORIDE 300 MG/1
10 TABLET ORAL ONCE
Refills: 0 | Status: COMPLETED | OUTPATIENT
Start: 2024-07-10 | End: 2024-07-10

## 2024-07-10 RX ORDER — DEXAMETHASONE 1 MG/1
4 TABLET ORAL ONCE
Refills: 0 | Status: CANCELLED | OUTPATIENT
Start: 2024-07-13 | End: 2024-07-11

## 2024-07-10 RX ORDER — HYDROMORPHONE HCL 0.2 MG/ML
0.5 INJECTION, SOLUTION INTRAVENOUS
Refills: 0 | Status: DISCONTINUED | OUTPATIENT
Start: 2024-07-10 | End: 2024-07-10

## 2024-07-10 RX ORDER — DEXAMETHASONE 1 MG/1
4 TABLET ORAL EVERY 6 HOURS
Refills: 0 | Status: DISCONTINUED | OUTPATIENT
Start: 2024-07-11 | End: 2024-07-11

## 2024-07-10 RX ORDER — DEXAMETHASONE 1 MG/1
4 TABLET ORAL EVERY 12 HOURS
Refills: 0 | Status: CANCELLED | OUTPATIENT
Start: 2024-07-12 | End: 2024-07-11

## 2024-07-10 RX ORDER — POTASSIUM CHLORIDE 600 MG/1
40 TABLET, FILM COATED, EXTENDED RELEASE ORAL ONCE
Refills: 0 | Status: DISCONTINUED | OUTPATIENT
Start: 2024-07-10 | End: 2024-07-10

## 2024-07-10 RX ORDER — PANTOPRAZOLE SODIUM 40 MG/10ML
40 INJECTION, POWDER, FOR SOLUTION INTRAVENOUS
Refills: 0 | Status: DISCONTINUED | OUTPATIENT
Start: 2024-07-10 | End: 2024-07-11

## 2024-07-10 RX ADMIN — Medication 1 APPLICATION(S): at 06:00

## 2024-07-10 RX ADMIN — HYDROMORPHONE HCL 0.5 MILLIGRAM(S): 0.2 INJECTION, SOLUTION INTRAVENOUS at 09:47

## 2024-07-10 RX ADMIN — OXYCODONE HYDROCHLORIDE 10 MILLIGRAM(S): 100 SOLUTION ORAL at 10:00

## 2024-07-10 RX ADMIN — APREPITANT 40 MILLIGRAM(S): KIT at 06:12

## 2024-07-10 RX ADMIN — Medication 40 MILLILITER(S): at 01:13

## 2024-07-10 RX ADMIN — LABETALOL HYDROCHLORIDE 10 MILLIGRAM(S): 300 TABLET ORAL at 10:43

## 2024-07-10 RX ADMIN — Medication 1000 MILLIGRAM(S): at 06:10

## 2024-07-10 RX ADMIN — AMLODIPINE BESYLATE 2.5 MILLIGRAM(S): 2.5 TABLET ORAL at 05:48

## 2024-07-10 RX ADMIN — OXYCODONE HYDROCHLORIDE 10 MILLIGRAM(S): 100 SOLUTION ORAL at 10:29

## 2024-07-10 RX ADMIN — Medication 25 MICROGRAM(S): at 05:48

## 2024-07-10 RX ADMIN — BENZOCAINE AND MENTHOL 1 LOZENGE: 15; 3.6 LOZENGE ORAL at 11:49

## 2024-07-10 RX ADMIN — HYDROMORPHONE HCL 0.5 MILLIGRAM(S): 0.2 INJECTION, SOLUTION INTRAVENOUS at 09:52

## 2024-07-10 RX ADMIN — Medication 75 MILLILITER(S): at 09:51

## 2024-07-10 RX ADMIN — Medication 1000 MILLIGRAM(S): at 06:45

## 2024-07-10 NOTE — BRIEF OPERATIVE NOTE - NSICDXBRIEFPROCEDURE_GEN_ALL_CORE_FT
PROCEDURES:  Rhizotomy, nerve, trigeminal, with stereotactic guidance 10-Jul-2024 07:29:55  Joseph Gomes  
Fall with Harm Risk

## 2024-07-10 NOTE — ASU DISCHARGE PLAN (ADULT/PEDIATRIC) - ASU DC SPECIAL INSTRUCTIONSFT
Do not bath for 2 days. After 2 days can shower. Do not scrub incision, allow water to wash over the incision. Do not submerge in water for 14 days. Do not remove steri-strips they will fall off. Call the office with any redness, burning, drainage, wound opening, worsening pain, redness, fevers. Take OTC tylenol as needed for pain and pain medications sent to the pharmacy. Follow-up in the office as scheduled.

## 2024-07-10 NOTE — PRE-ANESTHESIA EVALUATION ADULT - NSANTHOSAYNRD_GEN_A_CORE
No. TAEMKA screening performed.  STOP BANG Legend: 0-2 = LOW Risk; 3-4 = INTERMEDIATE Risk; 5-8 = HIGH Risk

## 2024-07-10 NOTE — ASU DISCHARGE PLAN (ADULT/PEDIATRIC) - CARE PROVIDER_API CALL
Issa Gonzales.  Neurosurgery  [address] [place] [address]  Phone: [phone]  Fax: [phone]  Established Patient  Follow Up Time: 2 weeks

## 2024-07-10 NOTE — BRIEF OPERATIVE NOTE - OPERATION/FINDINGS
s/p rhizotomy of Left trigeminal nerve  -no issues, <5cc blood loss  -woke up at baseline  -PACU then home

## 2024-07-10 NOTE — ASU DISCHARGE PLAN (ADULT/PEDIATRIC) - NS MD DC FALL RISK RISK
For information on Fall & Injury Prevention, visit: [email or web address] OR  [email or web address] OR  https://www.cdc.gov/steadi/patient.html

## 2024-07-10 NOTE — PROGRESS NOTE ADULT - SUBJECTIVE AND OBJECTIVE BOX
NEUROSURGERY POST OP NOTE:    POD# 0 S/P L percutaneous trigeminal rhizotomy    S: Patient seen and examined at bedside. Offers no complaints at this time.       T(C): 36.6 (07-10-24 @ 10:49), Max: 36.9 (07-09-24 @ 20:21)  HR: 70 (07-10-24 @ 11:36) (69 - 86)  BP: 142/65 (07-10-24 @ 11:36) (108/67 - 194/91)  RR: 16 (07-10-24 @ 11:36) (10 - 25)  SpO2: 99% (07-10-24 @ 11:36) (94% - 99%)      07-09-24 @ 07:01  -  07-10-24 @ 07:00  --------------------------------------------------------  IN: 320 mL / OUT: 0 mL / NET: 320 mL    07-10-24 @ 07:01  -  07-10-24 @ 11:47  --------------------------------------------------------  IN: 350 mL / OUT: 300 mL / NET: 50 mL        benzocaine/menthol Lozenge 1 Lozenge Oral once  HYDROmorphone  Injectable 0.5 milliGRAM(s) IV Push every 10 minutes PRN  oxyCODONE    IR 5 milliGRAM(s) Oral every 4 hours PRN  oxyCODONE    IR 10 milliGRAM(s) Oral every 4 hours PRN  sodium chloride 0.9%. 1000 milliLiter(s) IV Continuous <Continuous>      RADIOLOGY:     Exam:  Constitutional: NAD, resting comfortably in bed.   HEENT: Pupils equal, round, reactive to light. EOMI. Face symmetric, tongue midline.  Respiratory: No respiratory distress, lungs clear to auscultation bilaterally.   Cardiovascular: RRR, S1, S2.   Gastrointestinal: Abdomen soft, non tender, nondistended.  Neurological:  AAOX3. Opens eyes. Follows commands. Speech clear.  Cranial Nerves: II-XII intact  Motor: 5/5 power in b/l UE and LE  Sensation: +paresthesias to V3 portion of face on left side to light touch, o/w sensation intact to light touch in all extremities  Pronator Drift: none  Extremities: Warm, well perfused.  WOUND/DRAINS: L trigeminal rhizotomy site with gauze and tegaderm dressing in place, C/D/I    DEVICES:       Assessment: 78 year old female, PMH HTN, HLD, trigeminal neuralgia s/p 2 rhizotomies, presented to outpt office with worsening L sided facial pain. Now s/p L percutaneous trigeminal rhizotomy (7/10).    Neuro:  - neuro/vitals q4  - pain control w/ tylenol, oxy and home carbamazepine 300mg qd  - decadron 4q6, 3 day taper to off  - no postop imaging needed    Cardiac:  - SBP <160  - Echo (7/8): EF 70-75%  - HTN: cont amlodipine 2.5 mg qd  - HLD: continue atorvastatin 20mg qd  - cardiac clearance obtained    Pulm:  - RA    GI:  - ADAT  - bowel regimen, pending BM    Renal:  - IVF until adequate PO intake  - voiding    Endo:  - hypothyroid: cont synthroid 25mcg    Heme:  - SCDs for DVT ppx    ID:  - afebrile    Dispo: regional, full code, pending home today    Discussed with Dr. Gonzales

## 2024-07-10 NOTE — PROGRESS NOTE ADULT - SUBJECTIVE AND OBJECTIVE BOX
Patient was seen and examined at bedside. Case discuss with the primary team. Pt  denied having any facial pain.     OBJECTIVE:  Vital Signs Last 24 Hrs  T(C): 36.6 (10 Jul 2024 10:49), Max: 36.9 (09 Jul 2024 20:21)  T(F): 97.8 (10 Jul 2024 10:49), Max: 98.4 (09 Jul 2024 20:21)  HR: 70 (10 Jul 2024 11:36) (69 - 86)  BP: 142/65 (10 Jul 2024 11:36) (108/67 - 194/91)  BP(mean): 94 (10 Jul 2024 11:36) (90 - 131)  RR: 16 (10 Jul 2024 11:36) (10 - 25)  SpO2: 99% (10 Jul 2024 11:36) (94% - 99%)    Parameters below as of 10 Jul 2024 11:36  Patient On (Oxygen Delivery Method): room air    PHYSICAL EXAM:  Gen: NAD laying in bed  HEENT: Left facial dressing C/D/I   CV: RRR, +S1/S2, no mumur  Pulm: CTA b/l no wheezing or crackles   Abd: soft, NTND + BS no rebound or guarding   Neuro: AAOX3; nonfocal       LABS:                        14.2   5.26  )-----------( 255      ( 10 Jul 2024 05:30 )             42.1     07-10    138  |  102  |  12  ----------------------------<  107<H>  3.5   |  24  |  0.53    Ca    8.6      10 Jul 2024 05:30  Phos  3.3     07-10  Mg     2.3     07-10    PT: 10.2 sec;   INR: 0.89    PTT:28.4 sec  CAPILLARY BLOOD GLUCOSE  POCT Blood Glucose.: 152 mg/dL (10 Jul 2024 11:35)    oxyCODONE    IR 5 milliGRAM(s) Oral every 4 hours PRN  oxyCODONE    IR 10 milliGRAM(s) Oral every 4 hours PRN  sodium chloride 0.9%. 1000 milliLiter(s) IV Continuous <Continuous>

## 2024-07-10 NOTE — PROGRESS NOTE ADULT - ASSESSMENT
79 yo F with hx of HTN, HLD, hypothyroidism, trigeminal neuralgia s/p 2 rhizotomies, presented to outpt office with worsening L sided facial pain now s/p rhizotomy on 7/10.     #Trigeminal neuralgia  #Post op s/p rhizotom  - Pt s/p rhizotomy today  - Continue pain control per primary team  -Will continue carbamazepine ER capsule 300mg daily once pt resumes PO     #HTN  - home amlodipine 2.5mg daily   -Will f/u BP and resume home medication     #HLD  - home medication Lipitor  -Will resume once pt is resuming PO     #Hypothyroidism  - home medication synthroid 25mcg daily   -Will resume once pt is resuming PO     #Osteoporosis  - hold home bisphosphonate, Ibandronate    #DVT ppx   - Continue SCDs    #DISPO  - As per primary team     40 minutes spent on total encounter. The necessity of the time spent during the encounter on this date of service was due to:    Review of hospital course, labs, vitals, radiology and medical records.  Direct patient encounter including bedside exam   Discussed plan of care with primary team   Documenting the encounter. 79 yo F with hx of HTN, HLD, hypothyroidism, trigeminal neuralgia s/p 2 rhizotomies, presented to outpt office with worsening L sided facial pain now s/p rhizotomy on 7/10.     #Trigeminal neuralgia  #Post op s/p rhizotom  - Pt s/p rhizotomy today  - Continue pain control per primary team  -Will continue carbamazepine ER capsule 300mg daily once pt resumes PO     #HTN  - home amlodipine 2.5mg daily   -Will f/u BP and resume home medication once pt is resuming PO     #HLD  - home medication Lipitor  -Will resume once pt is resuming PO     #Hypothyroidism  - home medication synthroid 25mcg daily   -Will resume once pt is resuming PO     #Osteoporosis  - hold home bisphosphonate, Ibandronate    #DVT ppx   - Continue SCDs    #DISPO  - As per primary team     40 minutes spent on total encounter. The necessity of the time spent during the encounter on this date of service was due to:    Review of hospital course, labs, vitals, radiology and medical records.  Direct patient encounter including bedside exam   Discussed plan of care with primary team   Documenting the encounter. 77 yo F with hx of HTN, HLD, hypothyroidism, trigeminal neuralgia s/p 2 rhizotomies, presented to outpt office with worsening L sided facial pain now s/p rhizotomy on 7/10.     #Trigeminal neuralgia  #Post op s/p rhizotomy  - Pt s/p rhizotomy today 7/10.   - Continue pain control per primary team  -Will continue carbamazepine ER capsule 300mg daily once pt resumes PO     #HTN  - home amlodipine 2.5mg daily   -Will f/u BP and resume home medication once pt is resuming PO     #HLD  - home medication Lipitor  -Will resume once pt is resuming PO     #Hypothyroidism  - home medication synthroid 25mcg daily   -Will resume once pt is resuming PO     #Osteoporosis  - hold home bisphosphonate, Ibandronate    #DVT ppx   - Continue SCDs    #DISPO  - As per primary team     40 minutes spent on total encounter. The necessity of the time spent during the encounter on this date of service was due to:    Review of hospital course, labs, vitals, radiology and medical records.  Direct patient encounter including bedside exam   Discussed plan of care with primary team   Documenting the encounter.

## 2024-07-11 ENCOUNTER — TRANSCRIPTION ENCOUNTER (OUTPATIENT)
Age: 79
End: 2024-07-11

## 2024-07-11 VITALS
SYSTOLIC BLOOD PRESSURE: 109 MMHG | OXYGEN SATURATION: 97 % | TEMPERATURE: 98 F | DIASTOLIC BLOOD PRESSURE: 56 MMHG | RESPIRATION RATE: 16 BRPM | HEART RATE: 72 BPM

## 2024-07-11 PROCEDURE — 71045 X-RAY EXAM CHEST 1 VIEW: CPT

## 2024-07-11 PROCEDURE — 82962 GLUCOSE BLOOD TEST: CPT

## 2024-07-11 PROCEDURE — 85730 THROMBOPLASTIN TIME PARTIAL: CPT

## 2024-07-11 PROCEDURE — 85027 COMPLETE CBC AUTOMATED: CPT

## 2024-07-11 PROCEDURE — 76000 FLUOROSCOPY <1 HR PHYS/QHP: CPT

## 2024-07-11 PROCEDURE — 84100 ASSAY OF PHOSPHORUS: CPT

## 2024-07-11 PROCEDURE — T1013: CPT

## 2024-07-11 PROCEDURE — 99285 EMERGENCY DEPT VISIT HI MDM: CPT

## 2024-07-11 PROCEDURE — 85610 PROTHROMBIN TIME: CPT

## 2024-07-11 PROCEDURE — 36415 COLL VENOUS BLD VENIPUNCTURE: CPT

## 2024-07-11 PROCEDURE — 99231 SBSQ HOSP IP/OBS SF/LOW 25: CPT

## 2024-07-11 PROCEDURE — 86850 RBC ANTIBODY SCREEN: CPT

## 2024-07-11 PROCEDURE — 86901 BLOOD TYPING SEROLOGIC RH(D): CPT

## 2024-07-11 PROCEDURE — 70450 CT HEAD/BRAIN W/O DYE: CPT | Mod: MC

## 2024-07-11 PROCEDURE — C8929: CPT

## 2024-07-11 PROCEDURE — 86900 BLOOD TYPING SEROLOGIC ABO: CPT

## 2024-07-11 PROCEDURE — 80048 BASIC METABOLIC PNL TOTAL CA: CPT

## 2024-07-11 PROCEDURE — 85025 COMPLETE CBC W/AUTO DIFF WBC: CPT

## 2024-07-11 PROCEDURE — 93005 ELECTROCARDIOGRAM TRACING: CPT

## 2024-07-11 PROCEDURE — 83735 ASSAY OF MAGNESIUM: CPT

## 2024-07-11 RX ORDER — PANTOPRAZOLE SODIUM 40 MG/10ML
1 INJECTION, POWDER, FOR SOLUTION INTRAVENOUS
Qty: 1 | Refills: 0
Start: 2024-07-11

## 2024-07-11 RX ORDER — CARBAMAZEPINE 200 MG/1
300 TABLET ORAL DAILY
Refills: 0 | Status: DISCONTINUED | OUTPATIENT
Start: 2024-07-11 | End: 2024-07-11

## 2024-07-11 RX ORDER — DEXAMETHASONE 1 MG/1
1 TABLET ORAL
Qty: 9 | Refills: 0
Start: 2024-07-11 | End: 2024-07-13

## 2024-07-11 RX ORDER — LEVOTHYROXINE SODIUM 25 MCG
25 TABLET ORAL DAILY
Refills: 0 | Status: DISCONTINUED | OUTPATIENT
Start: 2024-07-11 | End: 2024-07-11

## 2024-07-11 RX ADMIN — CARBAMAZEPINE 300 MILLIGRAM(S): 200 TABLET ORAL at 11:02

## 2024-07-11 RX ADMIN — PANTOPRAZOLE SODIUM 40 MILLIGRAM(S): 40 INJECTION, POWDER, FOR SOLUTION INTRAVENOUS at 06:01

## 2024-07-11 RX ADMIN — Medication 25 MICROGRAM(S): at 07:40

## 2024-07-11 RX ADMIN — DEXAMETHASONE 4 MILLIGRAM(S): 1 TABLET ORAL at 06:01

## 2024-07-11 RX ADMIN — DEXAMETHASONE 4 MILLIGRAM(S): 1 TABLET ORAL at 00:08

## 2024-07-11 NOTE — DISCHARGE NOTE NURSING/CASE MANAGEMENT/SOCIAL WORK - PATIENT PORTAL LINK FT
You can access the FollowMyHealth Patient Portal offered by [facility] by registering at the following website: [email or web address]. By joining [facility]’s FollowMyHealth portal, you will also be able to view your health information using other applications (apps) compatible with our system.

## 2024-07-11 NOTE — PROGRESS NOTE ADULT - SUBJECTIVE AND OBJECTIVE BOX
HPI:  78 year old female, PMH trigeminal neuralgia s/p rhizotomy, ostearthritis, HTN, HLD, presented with severe worsening facial pain. Pt started to experience L sided facial pain in 2018 after a tooth extraction and outpatient workup with MRI showed trigeminal neuralgia. Pt had rhizotomy in 2019 with Dr. Mckay with some improvement of symptoms which returned 8 months later. She had a repeat rhizotomy in 12/2020 with Dr. Mckay with improved symptoms again. Starting about 1 week ago, she has been experiencing worsening L sided face pain and "electrical" feelings similar to prior trigeminal neuralgia symptoms. She is unable to eat or drink due to pain. She was sent to ED for admission for rhizotomy and pain management. (08 Jul 2024 11:15)    HOSPITAL COURSE:  7/8: Admitted from ED. Pending medical clearance. SQL tonight  7/9: JUAN, pre-op OR tomorrow  7/10: JUAN, OR today  POD 0 s/p L percutaneous trigeminal rhizotomy.   7/11: POD 1 s/p L rhizotomy. JUAN overnight, neuro stable.     OVERNIGHT EVENTS:  Vital Signs Last 24 Hrs  T(C): 36.8 (11 Jul 2024 00:10), Max: 36.9 (10 Jul 2024 12:20)  T(F): 98.3 (11 Jul 2024 00:10), Max: 98.5 (10 Jul 2024 12:20)  HR: 77 (11 Jul 2024 00:10) (69 - 86)  BP: 99/53 (11 Jul 2024 00:10) (99/53 - 194/91)  BP(mean): 94 (10 Jul 2024 11:36) (90 - 131)  RR: 18 (11 Jul 2024 00:10) (10 - 25)  SpO2: 94% (11 Jul 2024 00:10) (94% - 99%)    Parameters below as of 11 Jul 2024 00:10  Patient On (Oxygen Delivery Method): room air    I&O's Summary    09 Jul 2024 07:01  -  10 Jul 2024 07:00  --------------------------------------------------------  IN: 320 mL / OUT: 0 mL / NET: 320 mL    10 Jul 2024 07:01  -  11 Jul 2024 00:56  --------------------------------------------------------  IN: 350 mL / OUT: 300 mL / NET: 50 mL    PHYSICAL EXAM:  Constitutional: NAD, resting comfortably in bed.   HEENT: Pupils equal, round, reactive to light. EOMI. Face symmetric, tongue midline.  Respiratory: No respiratory distress, lungs clear to auscultation bilaterally.   Cardiovascular: RRR, S1, S2.   Gastrointestinal: Abdomen soft, non tender, nondistended.  Neurological:  AAOX3. Opens eyes. Follows commands. Speech clear.  Cranial Nerves: II-XII intact  Motor: 5/5 power in b/l UE and LE  Sensation: +paresthesias to V3 portion of face on left side to light touch, o/w sensation intact to light touch in all extremities  Pronator Drift: none  Extremities: Warm, well perfused.  WOUND/DRAINS: L trigeminal rhizotomy site with gauze and tegaderm dressing in place, C/D/I    DIET:  [] NPO  [X] Mechanical  [] Tube feeds    LABS:                        14.2   5.26  )-----------( 255      ( 10 Jul 2024 05:30 )             42.1     07-10    138  |  102  |  12  ----------------------------<  107<H>  3.5   |  24  |  0.53    Ca    8.6      10 Jul 2024 05:30  Phos  3.3     07-10  Mg     2.3     07-10      PT/INR - ( 09 Jul 2024 20:37 )   PT: 10.2 sec;   INR: 0.89          PTT - ( 09 Jul 2024 20:37 )  PTT:28.4 sec  Urinalysis Basic - ( 10 Jul 2024 05:30 )    Color: x / Appearance: x / SG: x / pH: x  Gluc: 107 mg/dL / Ketone: x  / Bili: x / Urobili: x   Blood: x / Protein: x / Nitrite: x   Leuk Esterase: x / RBC: x / WBC x   Sq Epi: x / Non Sq Epi: x / Bacteria: x      CAPILLARY BLOOD GLUCOSE      POCT Blood Glucose.: 152 mg/dL (10 Jul 2024 11:35)    Allergies    No Known Allergies    Intolerances      MEDICATIONS:  Antibiotics:    Neuro:  oxyCODONE    IR 5 milliGRAM(s) Oral every 4 hours PRN  oxyCODONE    IR 10 milliGRAM(s) Oral every 4 hours PRN    Anticoagulation:    OTHER:  dexAMETHasone     Tablet   Oral   dexAMETHasone     Tablet 4 milliGRAM(s) Oral every 6 hours  pantoprazole    Tablet 40 milliGRAM(s) Oral before breakfast    IVF:  sodium chloride 0.9%. 1000 milliLiter(s) IV Continuous <Continuous>    ASSESSMENT:  78 year old female, PMH HTN, HLD, trigeminal neuralgia s/p 2 rhizotomies, presented to outpt office with worsening L sided facial pain. Now s/p L percutaneous trigeminal rhizotomy (7/10).    TRIGENMINAL NEURALGIA    Handoff    MEWS Score    Hypothyroid    HLD (hyperlipidemia)    GERD (gastroesophageal reflux disease)    Trigeminal neuralgia    Osteoporosis    Sensitive skin    Trigeminal neuralgia    Trigeminal neuralgia    Rhizotomy, nerve, trigeminal, with stereotactic guidance    Trigeminal neuralgia    Hypothyroid    Trigeminal neuralgia    HLD (hyperlipidemia)    Osteoporosis    Rhizotomy, nerve, trigeminal, with stereotactic guidance    S/P thyroidectomy    S/P cholecystectomy    FACIAL FACE    Trigeminal neuralgia    Room Service Assist    Hypothyroid    HLD (hyperlipidemia)    Osteoporosis    GERD (gastroesophageal reflux disease)    Sensitive skin    S/P cholecystectomy    SysAdmin_VisitLink    PLAN:  Neuro:  - neuro/vitals q4  - pain control w/ tylenol, oxy and home carbamazepine 300mg qd  - decadron 4q6, 3 day taper to off  - no postop imaging needed    Cardiac:  - SBP <160  - Echo (7/8): EF 70-75%  - HTN: cont amlodipine 2.5 mg qd  - HLD: continue atorvastatin 20mg qd  - cardiac clearance obtained    Pulm:  - RA    GI:  - regular  - bowel regimen, pending BM  - protonix while on steroids    Renal:  - IVF while NPO  - voiding    Endo:  - hypothyroid: cont synthroid 25mcg    Heme:  - SCDs for DVT ppx    ID:  - afebrile    Dispo: regional, full code, pending PT/OT    Discussed with Dr. Gonzales

## 2024-07-11 NOTE — DISCHARGE NOTE NURSING/CASE MANAGEMENT/SOCIAL WORK - NSDCPEFALRISK_GEN_ALL_CORE
For information on Fall & Injury Prevention, visit: [email or web address] OR  [email or web address] OR  https://www.cdc.gov/steadi/patient.html

## 2024-07-11 NOTE — DISCHARGE NOTE NURSING/CASE MANAGEMENT/SOCIAL WORK - NSDCFUADDAPPT_GEN_ALL_CORE_FT
Please follow up with Dr. Gonzales in the outpatient office on 7/22/24 at 10AM. Call [phone] to confirm appointment date and time.     Please also follow up with your Primary Care Doctor.

## 2024-07-17 DIAGNOSIS — E78.5 HYPERLIPIDEMIA, UNSPECIFIED: ICD-10-CM

## 2024-07-17 DIAGNOSIS — E03.9 HYPOTHYROIDISM, UNSPECIFIED: ICD-10-CM

## 2024-07-17 DIAGNOSIS — K21.9 GASTRO-ESOPHAGEAL REFLUX DISEASE WITHOUT ESOPHAGITIS: ICD-10-CM

## 2024-07-17 DIAGNOSIS — M81.0 AGE-RELATED OSTEOPOROSIS WITHOUT CURRENT PATHOLOGICAL FRACTURE: ICD-10-CM

## 2024-07-17 DIAGNOSIS — M19.90 UNSPECIFIED OSTEOARTHRITIS, UNSPECIFIED SITE: ICD-10-CM

## 2024-07-17 DIAGNOSIS — G50.0 TRIGEMINAL NEURALGIA: ICD-10-CM

## 2024-07-17 DIAGNOSIS — I10 ESSENTIAL (PRIMARY) HYPERTENSION: ICD-10-CM

## 2024-07-17 DIAGNOSIS — R62.7 ADULT FAILURE TO THRIVE: ICD-10-CM

## 2024-07-17 DIAGNOSIS — Z79.890 HORMONE REPLACEMENT THERAPY: ICD-10-CM

## 2024-07-25 PROBLEM — Z86.39 HISTORY OF THYROID NODULE: Status: RESOLVED | Noted: 2019-02-20 | Resolved: 2024-07-25

## 2024-07-29 ENCOUNTER — APPOINTMENT (OUTPATIENT)
Dept: NEUROSURGERY | Facility: CLINIC | Age: 79
End: 2024-07-29

## 2024-07-29 VITALS
WEIGHT: 103 LBS | TEMPERATURE: 97.8 F | DIASTOLIC BLOOD PRESSURE: 70 MMHG | HEIGHT: 59 IN | SYSTOLIC BLOOD PRESSURE: 127 MMHG | HEART RATE: 75 BPM | RESPIRATION RATE: 18 BRPM | OXYGEN SATURATION: 98 % | BODY MASS INDEX: 20.76 KG/M2

## 2024-07-29 DIAGNOSIS — G50.0 TRIGEMINAL NEURALGIA: ICD-10-CM

## 2024-07-29 DIAGNOSIS — Z86.39 PERSONAL HISTORY OF OTHER ENDOCRINE, NUTRITIONAL AND METABOLIC DISEASE: ICD-10-CM

## 2024-07-29 PROCEDURE — 99024 POSTOP FOLLOW-UP VISIT: CPT

## 2024-07-30 RX ORDER — DIAZEPAM 2 MG/1
2 TABLET ORAL TWICE DAILY
Qty: 20 | Refills: 0 | Status: ACTIVE | COMMUNITY
Start: 2024-07-30 | End: 1900-01-01

## 2024-08-08 NOTE — HISTORY OF PRESENT ILLNESS
[FreeTextEntry1] : L trigeminal neuralgia (h/o percutaneous Rhizotomy with Dr. Mckay) [de-identified] : TESSY ANDRE is a 78 year-old Mohawk-speaking female with a PMHx significant for Trigeminal s/p Rhizotomy (2019, 2020), Osteoarthritis, HTN, Hypercholesterolemia, who presents to my office today for worsening Left trigeminal neuralgia.    Aug 2018 patient started experiencing significant shock-like Left facial pain after Lower molar tooth extraction. The pain persisted and multiple dental follow-ups proved negative as the pain etiology. She then sought treatment from her PCP who referred her to a Neurologist. MRI Brain (2/4/2019) with linear hyperintensity in the L frontal temporal lobe.  Patient saw NP Lorraine 2/20/2019 at which point plan made to treat Trigeminal Neuralgia with Rhizotomy.    3/1/19 - Left trigeminal Percutaneous Radiofrequency Rhizotomy with Dr. Mckay   3/13/19 - f/u appt with Dr. Mckay. Patient reports doing well the "electricity" of the face which prohibited talking and eating has subsided but she is concerned about numbness of the face. Reports discomfort of the Left face/site of intervention.  Immediate postop she was with periods of drooling but this has also improved. Patient to return to clinic in 4 weeks for symptom check   4/24/19 - f/u appt with Dr. Mckay. Reports she continue to do well "the electricity" is gone.  She has a little numbness but is concerned about episodes of drooling (will meet with ENT).   12/2/20 - f/u appt with Dr. Mckay. Reports she has been doing well until about 1 month ago she began having "electrical" sensation on the LEFT cheek. the pain has been "growing" and she is now unable to eat/ drink/ brush her teeth. Symptoms persist despite continuation of Carbamazepine at a decreased dose than preop (current dose 300mg QHS). She was recently advised to increase the dose but reports she becomes too sleepy so she only uses the medication at nights. Denies facial weakness/numbness but has intermittent numbness of her tongue. Dr Mckay discussed a 2nd percutaneous Rhizotomy  12/10/2020 - 2nd Left Percutaneous Rhizotomy with Dr. Mckay  12/23/2020 - f/u appt with Dr. Mckay. Reports doing well preop LEFT facial pain has resolved but she notices "numbness/feeling of anesthesia of the lip". Continue to increase activity as tolerated.  Educated patient that medications can be weaned over time with the assistance of ordering MD. Also that a small maintenance dose may be needed  5/3/22 - Reports she had been doing well until January of this year inability she noticed breakthrough facial pain resulting in an inability to eat (V3). This Similar to her pre- procedure symptoms. Denies facial weakness/numbness. Reports a recent visit saw her neurologist as well as her dentist and was told she need a molar tooth extracted but was cautioned against this by her neurologist. Symptoms persist despite continued Carbamezapine 300mg.   Neuro: Dr. Nitin Mccray   She underwent elective rhizotomy for nerve ablation, post op hospital stay was uneventful and she was discharged to home on the same day.

## 2024-08-08 NOTE — REASON FOR VISIT
[Friend] : friend [[employer] ] : provided by [employer]   [de-identified] : Percutaneous radiofrequency lesioning/rhizotomy of trigeminal nerve in the Gasserian ganglion, left side. [de-identified] : 7/10/2024 [TWNoteComboBox1] : [place]

## 2024-08-08 NOTE — REASON FOR VISIT
[Friend] : friend [[employer] ] : provided by [employer]   [de-identified] : Percutaneous radiofrequency lesioning/rhizotomy of trigeminal nerve in the Gasserian ganglion, left side. [de-identified] : 7/10/2024 [TWNoteComboBox1] : [place]

## 2024-08-08 NOTE — REASON FOR VISIT
[Friend] : friend [[employer] ] : provided by [employer]   [de-identified] : Percutaneous radiofrequency lesioning/rhizotomy of trigeminal nerve in the Gasserian ganglion, left side. [de-identified] : 7/10/2024 [TWNoteComboBox1] : [place]

## 2024-08-08 NOTE — ASSESSMENT
[FreeTextEntry1] : stable post op recovery, facial pain resolved  PLAN - valium 2.5 mg for L jaw muscle spasm - home stretching exercise for L jaw stiffness - RTC in 3 month for follow up (no images needed at that time)  I, Dr. Issa Gonzales, personally performed the evaluation and management (E/M) services for this established patient who presents today with (a) new problem(s)/exacerbation of (an) existing condition(s). That E/M includes conducting the clinically appropriate interval history &/or exam, assessing all new/exacerbated conditions, and establishing a new plan of care. Today, my ISABELLE, Hyunchu Lisa-Gold, was here to observe my evaluation and management service for this new problem/exacerbated condition and follow the plan of care established by me going forward.

## 2024-08-08 NOTE — HISTORY OF PRESENT ILLNESS
[FreeTextEntry1] : L trigeminal neuralgia (h/o percutaneous Rhizotomy with Dr. Mckay) [de-identified] : TESSY ANDRE is a 78 year-old [place]ian-speaking female with a PMHx significant for Trigeminal s/p Rhizotomy (2019, 2020), Osteoarthritis, HTN, Hypercholesterolemia, who presents to my office today for worsening Left trigeminal neuralgia.    Aug 2018 patient started experiencing significant shock-like Left facial pain after Lower molar tooth extraction. The pain persisted and multiple dental follow-ups proved negative as the pain etiology. She then sought treatment from her PCP who referred her to a Neurologist. MRI Brain (2/4/2019) with linear hyperintensity in the L frontal temporal lobe.  Patient saw NP Lorraine 2/20/2019 at which point plan made to treat Trigeminal Neuralgia with Rhizotomy.    3/1/19 - Left trigeminal Percutaneous Radiofrequency Rhizotomy with Dr. Mckay   3/13/19 - f/u appt with Dr. Mckay. Patient reports doing well the "electricity" of the face which prohibited talking and eating has subsided but she is concerned about numbness of the face. Reports discomfort of the Left face/site of intervention.  Immediate postop she was with periods of drooling but this has also improved. Patient to return to clinic in 4 weeks for symptom check   4/24/19 - f/u appt with Dr. Mckay. Reports she continue to do well "the electricity" is gone.  She has a little numbness but is concerned about episodes of drooling (will meet with ENT).   12/2/20 - f/u appt with Dr. Mckay. Reports she has been doing well until about 1 month ago she began having "electrical" sensation on the LEFT cheek. the pain has been "growing" and she is now unable to eat/ drink/ brush her teeth. Symptoms persist despite continuation of Carbamazepine at a decreased dose than preop (current dose 300mg QHS). She was recently advised to increase the dose but reports she becomes too sleepy so she only uses the medication at nights. Denies facial weakness/numbness but has intermittent numbness of her tongue. Dr Mckay discussed a 2nd percutaneous Rhizotomy  12/10/2020 - 2nd Left Percutaneous Rhizotomy with Dr. Mckay  12/23/2020 - f/u appt with Dr. Mckay. Reports doing well preop LEFT facial pain has resolved but she notices "numbness/feeling of anesthesia of the lip". Continue to increase activity as tolerated.  Educated patient that medications can be weaned over time with the assistance of ordering MD. Also that a small maintenance dose may be needed  5/3/22 - Reports she had been doing well until January of this year inability she noticed breakthrough facial pain resulting in an inability to eat (V3). This Similar to her pre- procedure symptoms. Denies facial weakness/numbness. Reports a recent visit saw her neurologist as well as her dentist and was told she need a molar tooth extracted but was cautioned against this by her neurologist. Symptoms persist despite continued Carbamezapine 300mg.   Neuro: Dr. Nitin Mccray   She underwent elective rhizotomy for nerve ablation, post op hospital stay was uneventful and she was discharged to home on the same day.

## 2024-08-08 NOTE — HISTORY OF PRESENT ILLNESS
[FreeTextEntry1] : L trigeminal neuralgia (h/o percutaneous Rhizotomy with Dr. Mckay) [de-identified] : TESSY ANDRE is a 78 year-old German-speaking female with a PMHx significant for Trigeminal s/p Rhizotomy (2019, 2020), Osteoarthritis, HTN, Hypercholesterolemia, who presents to my office today for worsening Left trigeminal neuralgia.    Aug 2018 patient started experiencing significant shock-like Left facial pain after Lower molar tooth extraction. The pain persisted and multiple dental follow-ups proved negative as the pain etiology. She then sought treatment from her PCP who referred her to a Neurologist. MRI Brain (2/4/2019) with linear hyperintensity in the L frontal temporal lobe.  Patient saw NP Lorraine 2/20/2019 at which point plan made to treat Trigeminal Neuralgia with Rhizotomy.    3/1/19 - Left trigeminal Percutaneous Radiofrequency Rhizotomy with Dr. Mckay   3/13/19 - f/u appt with Dr. Mckay. Patient reports doing well the "electricity" of the face which prohibited talking and eating has subsided but she is concerned about numbness of the face. Reports discomfort of the Left face/site of intervention.  Immediate postop she was with periods of drooling but this has also improved. Patient to return to clinic in 4 weeks for symptom check   4/24/19 - f/u appt with Dr. Mckay. Reports she continue to do well "the electricity" is gone.  She has a little numbness but is concerned about episodes of drooling (will meet with ENT).   12/2/20 - f/u appt with Dr. Mckay. Reports she has been doing well until about 1 month ago she began having "electrical" sensation on the LEFT cheek. the pain has been "growing" and she is now unable to eat/ drink/ brush her teeth. Symptoms persist despite continuation of Carbamazepine at a decreased dose than preop (current dose 300mg QHS). She was recently advised to increase the dose but reports she becomes too sleepy so she only uses the medication at nights. Denies facial weakness/numbness but has intermittent numbness of her tongue. Dr Mckay discussed a 2nd percutaneous Rhizotomy  12/10/2020 - 2nd Left Percutaneous Rhizotomy with Dr. Mckay  12/23/2020 - f/u appt with Dr. Mckay. Reports doing well preop LEFT facial pain has resolved but she notices "numbness/feeling of anesthesia of the lip". Continue to increase activity as tolerated.  Educated patient that medications can be weaned over time with the assistance of ordering MD. Also that a small maintenance dose may be needed  5/3/22 - Reports she had been doing well until January of this year inability she noticed breakthrough facial pain resulting in an inability to eat (V3). This Similar to her pre- procedure symptoms. Denies facial weakness/numbness. Reports a recent visit saw her neurologist as well as her dentist and was told she need a molar tooth extracted but was cautioned against this by her neurologist. Symptoms persist despite continued Carbamezapine 300mg.   Neuro: Dr. Nitin Mccray   She underwent elective rhizotomy for nerve ablation, post op hospital stay was uneventful and she was discharged to home on the same day.

## 2024-10-23 PROBLEM — Z78.9 NO PERTINENT PAST MEDICAL HISTORY: Status: RESOLVED | Noted: 2024-10-23 | Resolved: 2024-10-23

## 2024-10-28 ENCOUNTER — APPOINTMENT (OUTPATIENT)
Dept: NEUROSURGERY | Facility: CLINIC | Age: 79
End: 2024-10-28

## 2024-10-28 DIAGNOSIS — R51.9 HEADACHE, UNSPECIFIED: ICD-10-CM

## 2024-10-28 DIAGNOSIS — G50.0 TRIGEMINAL NEURALGIA: ICD-10-CM

## 2024-10-28 DIAGNOSIS — Z78.9 OTHER SPECIFIED HEALTH STATUS: ICD-10-CM

## (undated) DEVICE — DRAPE LIGHT HANDLE COVER (BLUE)

## (undated) DEVICE — RAYTEC SPONGE 4X4 16PLY

## (undated) DEVICE — DRAPE C ARM 41X74"

## (undated) DEVICE — NEPTUNE II 4-PORT MANIFOLD

## (undated) DEVICE — VENODYNE/SCD SLEEVE CALF MEDIUM

## (undated) DEVICE — GLV 8.5 PROTEXIS (WHITE)

## (undated) DEVICE — GOWN IMPERV BREATHABLE XL

## (undated) DEVICE — DRSG RESTRAINT LIMB WRAP AROUND

## (undated) DEVICE — DRSG BANDAID 0.75X3"

## (undated) DEVICE — DRAPE TOWEL BLUE 17" X 24"

## (undated) DEVICE — WARMING BLANKET LOWER ADULT

## (undated) DEVICE — DRAPE MAYO STAND 30"

## (undated) DEVICE — DRAPE SPLIT SHEET 77" X 120"

## (undated) DEVICE — GLV 8 PROTEXIS (WHITE)

## (undated) DEVICE — DRAPE 1/2 SHEET 40X57"

## (undated) DEVICE — NDL SPINAL 25G X 3.5" (BLUE)

## (undated) DEVICE — DRAPE EQUIPMENT BANDED BAG 30 X 30" (SHOWER CAP)

## (undated) DEVICE — ELCTR STRYKER NEPTUNE SMOKE EVACUATION PENCIL (GREEN)

## (undated) DEVICE — BLADE SCALPEL SAFETY #11 WITH PLASTIC GREEN HANDLE

## (undated) DEVICE — Device

## (undated) DEVICE — MARKING PEN W RULER

## (undated) DEVICE — NDL HYPO SAFE 25G X 1.5" (ORANGE)